# Patient Record
Sex: FEMALE | Race: WHITE | Employment: FULL TIME | URBAN - METROPOLITAN AREA
[De-identification: names, ages, dates, MRNs, and addresses within clinical notes are randomized per-mention and may not be internally consistent; named-entity substitution may affect disease eponyms.]

---

## 2020-01-21 PROBLEM — K37 APPENDICITIS: Status: ACTIVE | Noted: 2020-01-21

## 2021-07-23 ENCOUNTER — TELEPHONE (OUTPATIENT)
Dept: PAIN MANAGEMENT | Age: 31
End: 2021-07-23

## 2023-02-24 LAB
ERYTHROCYTE DISTRIBUTION WIDTH (RATIO) BY AUTOMATED COUNT: 13.3 % (ref 11.5–14.5)
ERYTHROCYTE MEAN CORPUSCULAR HEMOGLOBIN CONCENTRATION (G/DL) BY AUTOMATED: 31.8 G/DL (ref 32–36)
ERYTHROCYTE MEAN CORPUSCULAR VOLUME (FL) BY AUTOMATED COUNT: 88 FL (ref 80–100)
ERYTHROCYTES (10*6/UL) IN BLOOD BY AUTOMATED COUNT: 3.68 X10E12/L (ref 4–5.2)
GLUCOSE, 1 HR SCREEN, PREG: 139 MG/DL
HEMATOCRIT (%) IN BLOOD BY AUTOMATED COUNT: 32.4 % (ref 36–46)
HEMOGLOBIN (G/DL) IN BLOOD: 10.3 G/DL (ref 12–16)
LEUKOCYTES (10*3/UL) IN BLOOD BY AUTOMATED COUNT: 14.8 X10E9/L (ref 4.4–11.3)
PLATELETS (10*3/UL) IN BLOOD AUTOMATED COUNT: 354 X10E9/L (ref 150–450)
SYPHILIS TOTAL AB: NONREACTIVE

## 2023-03-03 LAB
COBALAMIN (VITAMIN B12) (PG/ML) IN SER/PLAS: 110 PG/ML (ref 211–911)
FERRITIN (UG/LL) IN SER/PLAS: 15 UG/L (ref 8–150)
FOLATE (NG/ML) IN SER/PLAS: 6.6 NG/ML
GLUCOSE THREE HOUR: 93 MG/DL
GLUCOSE TWO HOUR: 168 MG/DL
GLUCOSE, FASTING: 72 MG/DL
GLUCOSE, ONE HOUR: 165 MG/DL
GTTCM: ABNORMAL
IRON (UG/DL) IN SER/PLAS: 53 UG/DL (ref 35–150)
IRON BINDING CAPACITY (UG/DL) IN SER/PLAS: >503 UG/DL (ref 240–445)
IRON SATURATION (%) IN SER/PLAS: ABNORMAL % (ref 25–45)

## 2023-03-24 LAB
ERYTHROCYTE DISTRIBUTION WIDTH (RATIO) BY AUTOMATED COUNT: 13.2 % (ref 11.5–14.5)
ERYTHROCYTE MEAN CORPUSCULAR HEMOGLOBIN CONCENTRATION (G/DL) BY AUTOMATED: 31.7 G/DL (ref 32–36)
ERYTHROCYTE MEAN CORPUSCULAR VOLUME (FL) BY AUTOMATED COUNT: 86 FL (ref 80–100)
ERYTHROCYTES (10*6/UL) IN BLOOD BY AUTOMATED COUNT: 3.74 X10E12/L (ref 4–5.2)
FERRITIN, PREGNANCY: 15 UG/L
HEMATOCRIT (%) IN BLOOD BY AUTOMATED COUNT: 32.2 % (ref 36–46)
HEMOGLOBIN (G/DL) IN BLOOD: 10.2 G/DL (ref 12–16)
HEMOGLOBIN (PG) IN RETICULOCYTES: 28 PG (ref 28–38)
IRON (UG/DL) IN SER/PLAS IN PREGNANCY: 68 UG/DL
IRON BINDING CAPACITY (UG/DL) IN PREGNANCY: >518 UG/DL
IRON SATURATION (%) IN PREGNANCY: ABNORMAL %
LEUKOCYTES (10*3/UL) IN BLOOD BY AUTOMATED COUNT: 13.2 X10E9/L (ref 4.4–11.3)
PLATELETS (10*3/UL) IN BLOOD AUTOMATED COUNT: 365 X10E9/L (ref 150–450)
REFLEX ADDED, ANEMIA PANEL: ABNORMAL
RETICULOCYTES (10*3/UL) IN BLOOD: 0.07 X10E12/L (ref 0.02–0.08)
RETICULOCYTES/100 ERYTHROCYTES IN BLOOD BY AUTOMATED COUNT: 2 % (ref 0.5–2)

## 2023-03-25 LAB
FOLATE, SERUM, PREGNANCY: 9.1 NG/ML
VITAMIN B12, PREGNANCY: 238 PG/ML

## 2023-04-03 ENCOUNTER — HOSPITAL ENCOUNTER (OUTPATIENT)
Dept: DATA CONVERSION | Facility: HOSPITAL | Age: 33
End: 2023-04-03
Attending: OBSTETRICS & GYNECOLOGY
Payer: MEDICAID

## 2023-04-03 DIAGNOSIS — R10.9 UNSPECIFIED ABDOMINAL PAIN: ICD-10-CM

## 2023-04-03 DIAGNOSIS — F17.290 NICOTINE DEPENDENCE, OTHER TOBACCO PRODUCT, UNCOMPLICATED: ICD-10-CM

## 2023-04-03 DIAGNOSIS — N89.8 OTHER SPECIFIED NONINFLAMMATORY DISORDERS OF VAGINA: ICD-10-CM

## 2023-04-03 DIAGNOSIS — O99.333 SMOKING (TOBACCO) COMPLICATING PREGNANCY, THIRD TRIMESTER (HHS-HCC): ICD-10-CM

## 2023-04-03 DIAGNOSIS — Z3A.32 32 WEEKS GESTATION OF PREGNANCY (HHS-HCC): ICD-10-CM

## 2023-04-03 DIAGNOSIS — O26.893 OTHER SPECIFIED PREGNANCY RELATED CONDITIONS, THIRD TRIMESTER (HHS-HCC): ICD-10-CM

## 2023-04-21 ENCOUNTER — HOSPITAL ENCOUNTER (OUTPATIENT)
Dept: DATA CONVERSION | Facility: HOSPITAL | Age: 33
End: 2023-04-21
Attending: OBSTETRICS & GYNECOLOGY
Payer: MEDICAID

## 2023-04-21 DIAGNOSIS — O99.343 OTHER MENTAL DISORDERS COMPLICATING PREGNANCY, THIRD TRIMESTER (HHS-HCC): ICD-10-CM

## 2023-04-21 DIAGNOSIS — O99.013 ANEMIA COMPLICATING PREGNANCY, THIRD TRIMESTER (HHS-HCC): ICD-10-CM

## 2023-04-21 DIAGNOSIS — K59.00 CONSTIPATION, UNSPECIFIED: ICD-10-CM

## 2023-04-21 DIAGNOSIS — R10.9 UNSPECIFIED ABDOMINAL PAIN: ICD-10-CM

## 2023-04-21 DIAGNOSIS — F32.A DEPRESSION, UNSPECIFIED: ICD-10-CM

## 2023-04-21 DIAGNOSIS — O26.893 OTHER SPECIFIED PREGNANCY RELATED CONDITIONS, THIRD TRIMESTER (HHS-HCC): ICD-10-CM

## 2023-04-21 DIAGNOSIS — O99.333 SMOKING (TOBACCO) COMPLICATING PREGNANCY, THIRD TRIMESTER (HHS-HCC): ICD-10-CM

## 2023-04-21 DIAGNOSIS — F17.200 NICOTINE DEPENDENCE, UNSPECIFIED, UNCOMPLICATED: ICD-10-CM

## 2023-04-21 DIAGNOSIS — Z79.899 OTHER LONG TERM (CURRENT) DRUG THERAPY: ICD-10-CM

## 2023-04-21 DIAGNOSIS — Z3A.35 35 WEEKS GESTATION OF PREGNANCY (HHS-HCC): ICD-10-CM

## 2023-04-21 DIAGNOSIS — F41.9 ANXIETY DISORDER, UNSPECIFIED: ICD-10-CM

## 2023-04-21 DIAGNOSIS — O99.613 DISEASES OF THE DIGESTIVE SYSTEM COMPLICATING PREGNANCY, THIRD TRIMESTER (HHS-HCC): ICD-10-CM

## 2023-04-21 DIAGNOSIS — D64.9 ANEMIA, UNSPECIFIED: ICD-10-CM

## 2023-04-21 LAB
APPEARANCE, URINE: CLEAR
BILIRUBIN, URINE: NEGATIVE
BLOOD, URINE: NEGATIVE
COLOR, URINE: ABNORMAL
GLUCOSE, URINE: NEGATIVE MG/DL
KETONES, URINE: NEGATIVE MG/DL
LEUKOCYTE ESTERASE, URINE: NEGATIVE
NITRITE, URINE: NEGATIVE
PH, URINE: 7 (ref 5–8)
PROTEIN, URINE: NEGATIVE MG/DL
SPECIFIC GRAVITY, URINE: 1 (ref 1–1.03)
UROBILINOGEN, URINE: <2 MG/DL (ref 0–1.9)

## 2023-05-01 LAB — GROUP B STREP SCREEN: NORMAL

## 2023-08-16 ENCOUNTER — TELEPHONE (OUTPATIENT)
Dept: PRIMARY CARE | Facility: CLINIC | Age: 33
End: 2023-08-16

## 2023-08-16 ENCOUNTER — OFFICE VISIT (OUTPATIENT)
Dept: PRIMARY CARE | Facility: CLINIC | Age: 33
End: 2023-08-16
Payer: MEDICAID

## 2023-08-16 VITALS
WEIGHT: 158 LBS | HEART RATE: 68 BPM | SYSTOLIC BLOOD PRESSURE: 122 MMHG | DIASTOLIC BLOOD PRESSURE: 70 MMHG | TEMPERATURE: 98.4 F | BODY MASS INDEX: 26.29 KG/M2

## 2023-08-16 DIAGNOSIS — G43.111 INTRACTABLE MIGRAINE WITH AURA WITH STATUS MIGRAINOSUS: Primary | ICD-10-CM

## 2023-08-16 PROBLEM — G43.909 MIGRAINE: Status: ACTIVE | Noted: 2023-08-16

## 2023-08-16 PROCEDURE — 99213 OFFICE O/P EST LOW 20 MIN: CPT | Performed by: INTERNAL MEDICINE

## 2023-08-16 PROCEDURE — 1036F TOBACCO NON-USER: CPT | Performed by: INTERNAL MEDICINE

## 2023-08-16 RX ORDER — NORETHINDRONE ACETATE AND ETHINYL ESTRADIOL, AND FERROUS FUMARATE 1.5-30(21)
1 KIT ORAL DAILY
COMMUNITY
End: 2024-06-06 | Stop reason: WASHOUT

## 2023-08-16 RX ORDER — ELETRIPTAN HYDROBROMIDE 20 MG/1
20 TABLET, FILM COATED ORAL ONCE AS NEEDED
Qty: 18 TABLET | Refills: 3 | Status: SHIPPED | OUTPATIENT
Start: 2023-08-16 | End: 2023-08-16

## 2023-08-16 RX ORDER — SUMATRIPTAN 50 MG/1
50 TABLET, FILM COATED ORAL ONCE AS NEEDED
Qty: 27 TABLET | Refills: 1 | Status: SHIPPED | OUTPATIENT
Start: 2023-08-16 | End: 2023-09-20 | Stop reason: SDUPTHER

## 2023-08-16 ASSESSMENT — ENCOUNTER SYMPTOMS
LIGHT-HEADEDNESS: 0
GASTROINTESTINAL NEGATIVE: 1
HEMATOLOGIC/LYMPHATIC NEGATIVE: 1
CARDIOVASCULAR NEGATIVE: 1
CONSTITUTIONAL NEGATIVE: 1
DIZZINESS: 0
RESPIRATORY NEGATIVE: 1
MUSCULOSKELETAL NEGATIVE: 1
EYES NEGATIVE: 1
FACIAL ASYMMETRY: 0
HEADACHES: 1
NUMBNESS: 0

## 2023-08-16 NOTE — PROGRESS NOTES
Subjective   Patient ID: Nicole Nunes is a 32 y.o. female who presents for Follow-up (Pt here for OV and migraine med refill).    HPI patient is here for intractable migraine for 3 days she restarted her birth control pill after her son was born 3 months ago and after starting the birth control pill her migraines have returned she has been trying to use Tylenol or Motrin.    Review of Systems   Constitutional: Negative.    HENT: Negative.     Eyes: Negative.    Respiratory: Negative.     Cardiovascular: Negative.    Gastrointestinal: Negative.    Genitourinary: Negative.    Musculoskeletal: Negative.    Neurological:  Positive for headaches. Negative for dizziness, syncope, facial asymmetry, light-headedness and numbness.   Hematological: Negative.    All other systems reviewed and are negative.      Objective   /70   Pulse 68   Temp 36.9 °C (98.4 °F)   Wt 71.7 kg (158 lb)   LMP 07/21/2023   BMI 26.29 kg/m²     Physical Exam  Vitals and nursing note reviewed.   Constitutional:       Appearance: Normal appearance.   HENT:      Head: Normocephalic and atraumatic.   Eyes:      Pupils: Pupils are equal, round, and reactive to light.   Pulmonary:      Effort: Pulmonary effort is normal.      Breath sounds: Normal breath sounds.   Neurological:      Mental Status: She is alert.       Assessment/Plan   Problem List Items Addressed This Visit       Migraine - Primary   Patient was given prescription of triptan initially Relpax which was not approved by insurance so then we sent prescription of sumatriptan.  Patient should avoid using excessive analgesics since she also can have medication overuse headache.  She will also talk to her gynecologist about alternate birth control strategy.  Follow-up with us in few weeks.

## 2023-09-06 VITALS — HEIGHT: 65 IN | BODY MASS INDEX: 30.63 KG/M2 | WEIGHT: 183.86 LBS

## 2023-09-07 VITALS — WEIGHT: 181.88 LBS | BODY MASS INDEX: 30.3 KG/M2 | HEIGHT: 65 IN

## 2023-09-14 NOTE — PROGRESS NOTES
"    Current Stage:   Stage: Triage     Subjective Data:   Antepartum   Vaginal Bleeding: No   Contractions/Abdominal Pain: Yes   Discharge/Loss of Fluid: No   Fetal Movement: Good   Fevers/Chills: No   Preeclampsia Symptoms: No   Antepartum:    33yo  @ 35.2 wks presents with c/o contractions, reports that she \"gets really bad menstrual cramps so this is the real thing\". Endorses GFM, denies any VB or  LOF. States that the pain began around 12 pm and hasn't let up at all. Denies urinary frequency or pain with urination. Reports she has been taking iron and struggling with constipation. Last BM yesterday morning.     Pregnancy notable for:  *Anx/dep, no meds  *Anemia, PO iron  *Failed 1 hr, passed 3 hr  *Headaches  *Constipation      Objective Information     Objective Information:      T   P  R  BP   MAP  SpO2   Value  36.7  106  18  119/76   92  98%  Date/Time  15:29  15:29  15:29  15:29   15:29  15:29  Range  (36.7C - 36.7C )  (99 - 125 )  (18 - 18 )  (119 - 119 )/ (76 - 76 )  (92 - 92 )  (97% - 98% )      Pain reported at  15:29: 6 = Moderate      Physical Exam:   Constitutional: alert, oriented   Obstetric: FHR: 145 BPM, moderate variability, +  accels, no decels  CTX: some irritability noted, no ctx  VE: closed, posterior   Membranes: Intact   Eyes: pupils equal, sclerae clear   Respiratory/Thorax: normal respiratory effort, lungs  clear, no wheezes or rhonchi   Cardiovascular: RRR, no murmurs   Gastrointestinal: soft, tender with palpation, +  BS   Genitourinary: bladder nondistended, no suprapubic  tenderness, no CVA tenderness   Musculoskeletal: Normal ROM   Extremities: no swelling, no calf tenderness   Breast: soft, nipples intact   Psychological: appropriate affect, calm, cooperative   Skin: no rashes or lesions     Assessment and Plan:   Assessment:    A: 33yo  @ 35.2 wks  Abdominal pain  Reactive NST    P: Urinalysis sent  Will recheck cervix in 2 hrs  Discussed " comfort measures, hydration, taking stool softener to help with constipation  Encouraged ambulation   If no change, will D/C home with PTL precautions.  Dr Goddard updated and agrees with POC    CHAN Ramires      Electronic Signatures for Addendum Section:   Sary Cormier (CHAN) (Signed Addendum 21-Apr-2023 16:47)   UA negative, pt declines a recheck and feels comfortable going home.   RTC as scheduled for [facility] visit next week or sooner PRN     Electronic Signatures:  Sary Cormier (HAROLDO-HANNY)  (Signed 21-Apr-2023 16:23)   Authored: Current Stage, Subjective Data, Objective Data,  Assessment and Plan, Note Completion      Last Updated: 21-Apr-2023 16:47 by Sary Cormier (HAROLDO-HANNY)

## 2023-09-20 ENCOUNTER — TELEMEDICINE (OUTPATIENT)
Dept: PRIMARY CARE | Facility: CLINIC | Age: 33
End: 2023-09-20
Payer: MEDICAID

## 2023-09-20 DIAGNOSIS — G43.111 INTRACTABLE MIGRAINE WITH AURA WITH STATUS MIGRAINOSUS: ICD-10-CM

## 2023-09-20 PROCEDURE — 99212 OFFICE O/P EST SF 10 MIN: CPT | Performed by: INTERNAL MEDICINE

## 2023-09-20 RX ORDER — SUMATRIPTAN 50 MG/1
50 TABLET, FILM COATED ORAL ONCE AS NEEDED
Qty: 27 TABLET | Refills: 1 | Status: SHIPPED | OUTPATIENT
Start: 2023-09-20 | End: 2024-09-19

## 2023-09-20 ASSESSMENT — ENCOUNTER SYMPTOMS
HEADACHES: 1
ENDOCRINE NEGATIVE: 1
CONSTITUTIONAL NEGATIVE: 1
EYES NEGATIVE: 1
RESPIRATORY NEGATIVE: 1

## 2023-09-20 NOTE — PROGRESS NOTES
Subjective   Patient ID: Nicole Nunes is a 33 y.o. female who presents for No chief complaint on file..    HPI patient is doing well she is on sumatriptan as needed for her migraines.  She has not talked to her gynecologist about changing her birth control pill which seem to be causing her migraines to flareup    Review of Systems   Constitutional: Negative.    Eyes: Negative.    Respiratory: Negative.     Endocrine: Negative.    Genitourinary: Negative.    Neurological:  Positive for headaches.   All other systems reviewed and are negative.      Objective   There were no vitals taken for this visit.    Physical Exam    Assessment/Plan   Problem List Items Addressed This Visit       Migraine    Relevant Medications    SUMAtriptan (Imitrex) 50 mg tablet   Patient's sumatriptan was refilled she was advised to talk to her gynecologist about alternate ways of birth control.  Follow-up with us in few weeks.

## 2023-10-17 ENCOUNTER — HOSPITAL ENCOUNTER (OUTPATIENT)
Dept: RADIOLOGY | Facility: HOSPITAL | Age: 33
Discharge: HOME | End: 2023-10-17
Payer: MEDICAID

## 2023-10-17 DIAGNOSIS — M23.90 UNSPECIFIED INTERNAL DERANGEMENT OF UNSPECIFIED KNEE: ICD-10-CM

## 2023-10-17 PROCEDURE — 73721 MRI JNT OF LWR EXTRE W/O DYE: CPT | Mod: RT

## 2023-10-17 PROCEDURE — 73721 MRI JNT OF LWR EXTRE W/O DYE: CPT | Mod: RIGHT SIDE | Performed by: RADIOLOGY

## 2023-10-26 ENCOUNTER — APPOINTMENT (OUTPATIENT)
Dept: ORTHOPEDIC SURGERY | Facility: CLINIC | Age: 33
End: 2023-10-26
Payer: MEDICAID

## 2023-11-01 PROBLEM — M84.30XA STRESS FRACTURE: Status: ACTIVE | Noted: 2023-11-01

## 2023-11-01 PROBLEM — S83.206A TEAR OF MENISCUS OF RIGHT KNEE: Status: ACTIVE | Noted: 2023-11-01

## 2023-11-01 PROBLEM — S46.019A TRAUMATIC ROTATOR CUFF TEAR: Status: ACTIVE | Noted: 2021-06-01

## 2023-11-01 PROBLEM — S43.92XA SPRAIN OF LEFT SHOULDER GIRDLE: Status: ACTIVE | Noted: 2023-11-01

## 2023-11-01 PROBLEM — F41.9 ANXIETY: Status: ACTIVE | Noted: 2023-11-01

## 2023-11-01 PROBLEM — M47.812 CERVICAL SPONDYLOSIS WITHOUT MYELOPATHY: Status: ACTIVE | Noted: 2021-06-01

## 2023-11-01 PROBLEM — O99.810 ABNORMAL MATERNAL GLUCOSE TOLERANCE, COMPLICATING PREGNANCY (HHS-HCC): Status: ACTIVE | Noted: 2023-11-01

## 2023-11-01 PROBLEM — K59.03 DRUG-INDUCED CONSTIPATION: Status: ACTIVE | Noted: 2023-11-01

## 2023-11-01 PROBLEM — R73.09 ABNORMAL GTT (GLUCOSE TOLERANCE TEST): Status: ACTIVE | Noted: 2023-11-01

## 2023-11-01 PROBLEM — S92.413A: Status: ACTIVE | Noted: 2023-11-01

## 2023-11-01 PROBLEM — A49.8 GARDNERELLA INFECTION: Status: ACTIVE | Noted: 2023-11-01

## 2023-11-01 PROBLEM — S83.90XA KNEE SPRAIN: Status: ACTIVE | Noted: 2023-11-01

## 2023-11-01 PROBLEM — O99.019 ANEMIA IN PREGNANCY (HHS-HCC): Status: ACTIVE | Noted: 2023-11-01

## 2023-11-01 PROBLEM — S83.519A ACL TEAR: Status: ACTIVE | Noted: 2023-11-01

## 2023-11-01 PROBLEM — S93.402A SPRAIN OF LEFT ANKLE: Status: ACTIVE | Noted: 2023-11-01

## 2023-11-01 PROBLEM — G44.84 BENIGN EXERTIONAL HEADACHE: Status: ACTIVE | Noted: 2023-11-01

## 2023-11-01 PROBLEM — G43.119: Status: ACTIVE | Noted: 2023-11-01

## 2023-11-01 PROBLEM — Z98.890 POST-OPERATIVE STATE: Status: ACTIVE | Noted: 2019-03-05

## 2023-11-01 PROBLEM — K37 APPENDICITIS: Status: ACTIVE | Noted: 2020-01-21

## 2023-11-01 PROBLEM — R53.83 FATIGUE: Status: ACTIVE | Noted: 2023-11-01

## 2023-11-01 PROBLEM — F41.0 PANIC ATTACKS: Status: ACTIVE | Noted: 2023-11-01

## 2023-11-01 PROBLEM — M23.91 INTERNAL DERANGEMENT OF RIGHT KNEE: Status: ACTIVE | Noted: 2023-11-01

## 2023-11-01 RX ORDER — ONDANSETRON 4 MG/1
TABLET, ORALLY DISINTEGRATING ORAL
COMMUNITY
End: 2024-06-06 | Stop reason: WASHOUT

## 2023-11-01 RX ORDER — KETOCONAZOLE 20 MG/G
CREAM TOPICAL
COMMUNITY
Start: 2023-09-13 | End: 2024-06-06 | Stop reason: WASHOUT

## 2023-11-01 RX ORDER — ONDANSETRON 4 MG/1
1 TABLET, FILM COATED ORAL EVERY 8 HOURS PRN
COMMUNITY
Start: 2019-12-01 | End: 2024-06-06 | Stop reason: WASHOUT

## 2023-11-01 RX ORDER — TRAZODONE HYDROCHLORIDE 50 MG/1
50-150 TABLET ORAL NIGHTLY
COMMUNITY
Start: 2015-07-14 | End: 2024-06-06 | Stop reason: WASHOUT

## 2023-11-01 RX ORDER — TIZANIDINE 4 MG/1
1 TABLET ORAL EVERY 8 HOURS PRN
COMMUNITY
Start: 2021-05-03 | End: 2024-06-06 | Stop reason: WASHOUT

## 2023-11-01 RX ORDER — SERTRALINE HYDROCHLORIDE 50 MG/1
50 TABLET, FILM COATED ORAL DAILY
COMMUNITY
End: 2024-06-06 | Stop reason: WASHOUT

## 2023-11-01 RX ORDER — ELETRIPTAN HYDROBROMIDE 20 MG/1
TABLET, FILM COATED ORAL
COMMUNITY
End: 2024-06-06 | Stop reason: WASHOUT

## 2023-11-01 RX ORDER — VERAPAMIL HYDROCHLORIDE 180 MG/1
180 CAPSULE, EXTENDED RELEASE ORAL
COMMUNITY
Start: 2015-07-14 | End: 2024-06-06 | Stop reason: WASHOUT

## 2023-11-01 RX ORDER — DOCUSATE SODIUM 100 MG/1
1 CAPSULE ORAL 2 TIMES DAILY
COMMUNITY
Start: 2023-03-05 | End: 2024-06-06 | Stop reason: WASHOUT

## 2023-11-01 RX ORDER — PNV NO.95/FERROUS FUM/FOLIC AC 28MG-0.8MG
1 TABLET ORAL DAILY
COMMUNITY
Start: 2022-09-26 | End: 2024-06-06 | Stop reason: WASHOUT

## 2023-11-01 RX ORDER — MELOXICAM 15 MG/1
TABLET ORAL DAILY PRN
COMMUNITY
Start: 2021-05-03 | End: 2024-06-06 | Stop reason: WASHOUT

## 2023-11-01 RX ORDER — KETOROLAC TROMETHAMINE 10 MG/1
1 TABLET, FILM COATED ORAL EVERY 6 HOURS PRN
COMMUNITY
Start: 2020-12-01 | End: 2024-06-06 | Stop reason: WASHOUT

## 2023-11-01 RX ORDER — GUAIFENESIN AND DEXTROMETHORPHAN HYDROBROMIDE 1200; 60 MG/1; MG/1
1 TABLET, EXTENDED RELEASE ORAL 2 TIMES DAILY
COMMUNITY
Start: 2020-12-28 | End: 2024-06-06 | Stop reason: WASHOUT

## 2023-11-01 RX ORDER — TRAMADOL HYDROCHLORIDE 50 MG/1
1-2 TABLET ORAL EVERY 6 HOURS PRN
COMMUNITY
Start: 2021-06-04 | End: 2024-06-06 | Stop reason: WASHOUT

## 2023-11-01 RX ORDER — FERROUS SULFATE 325(65) MG
65 TABLET ORAL 2 TIMES DAILY
COMMUNITY
Start: 2023-03-05 | End: 2024-06-06 | Stop reason: WASHOUT

## 2023-11-02 ENCOUNTER — OFFICE VISIT (OUTPATIENT)
Dept: ORTHOPEDIC SURGERY | Facility: CLINIC | Age: 33
End: 2023-11-02
Payer: MEDICAID

## 2023-11-02 DIAGNOSIS — M23.91 INTERNAL DERANGEMENT OF RIGHT KNEE: Primary | ICD-10-CM

## 2023-11-02 PROCEDURE — 1036F TOBACCO NON-USER: CPT | Performed by: ORTHOPAEDIC SURGERY

## 2023-11-02 PROCEDURE — 99213 OFFICE O/P EST LOW 20 MIN: CPT | Performed by: ORTHOPAEDIC SURGERY

## 2023-11-02 NOTE — PROGRESS NOTES
History of Present Illness:     Patient presents today for MRI review of the right knee after multiple surgeries including ACL reconstruction and partial medial and lateral meniscectomy as well as chondroplasty and subsequent arthroscopy with chondroplasty of the patella.  She is having recurring anterior knee pain and catching and locking that has been refractory to a variety of treatment allergies including rest, ice, anti-inflammatories, corticosteroid injection, PRP, physical therapy.  Is not helping her and she went to discuss other options today.  She recently had a baby and she is worried that she will catch and lock and fall and drop her child.  Review of Systems   GENERAL: Negative for malaise, significant weight loss, fever  MUSCULOSKELETAL: see HPI  NEURO:  Negative    Physical Examination:  Right knee:  Skin healthy and intact  No gross swelling or ecchymosis  No varus malalignment  No valgus malalignment   No effusion  ROM:  Full flexion   Full extension  No pain with internal rotation of the hip     No tenderness to palpation over medial joint line  No tenderness to palpation over lateral joint line  There is tenderness to palpation anteriorly and anterior lateral along the joint line.  There is tenderness to palpation with patellar compression and anterior posterior direction As well as medial lateral.  No laxity to valgus stress  No laxity to varus stress  Negative Lachman´s test  Negative anterior drawer test  Negative posterior drawer test  Equivocal Jose´s test     Neurovascular exam normal distally    Imaging:  MRI reveals no evidence of acute ligamentous abnormality, appropriately positioned and intact ACL graft, no evidence of recurrent meniscal tearing but evidence of prior meniscectomy.  There is mild patellofemoral cartilage wear noted without evidence of displaced fragments.    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  We discussed that these nerves in the fat pad are likely very irritated from multiple arthroscopies as well as overall disease progression of the knee.  We reviewed anti-inflammatories, rest, ice, steroid injections, physical therapy.  We discussed this problem is likely self-limiting over time but does take many months to fully improve.  Recommended continuing her anti-inflammatory regimen, and being patient with the knee going forward.  Structurally we discussed the knee is excellent, however we discussed that we feel perhaps her pain is contributing to her catching and locking events from anu quadriceps muscle group.    Roalndo Castanon PA-C    In a face to face encounter, I evaluated the patient and performed a physical examination, discussed pertinent diagnostic studies if indicated and discussed diagnosis and management strategies with both the patient and physician assistant / nurse practitioner.  I reviewed the PA/NP's note and agree with the documented findings and plan of care.    Patient with persistent anterior knee pain status post ACL reconstruction she has some minimal chondral wear but does have significant tenderness around her retropatellar fat pad and some occasional catching we discussed this is likely synovial fat pad impingement and may be tethered.  We discussed possible hydrodissection with normal saline.  Patient was amenable to this treatment plan overall no obvious structural abnormalities.    Leroy Cuba MD

## 2023-11-04 ENCOUNTER — HOSPITAL ENCOUNTER (EMERGENCY)
Age: 33
Discharge: HOME | End: 2023-11-04
Attending: EMERGENCY MEDICINE
Payer: MEDICAID

## 2023-11-04 VITALS
BODY MASS INDEX: 24.99 KG/M2 | WEIGHT: 150 LBS | SYSTOLIC BLOOD PRESSURE: 126 MMHG | HEART RATE: 113 BPM | HEIGHT: 65 IN | OXYGEN SATURATION: 98 % | TEMPERATURE: 97.6 F | RESPIRATION RATE: 18 BRPM | DIASTOLIC BLOOD PRESSURE: 87 MMHG

## 2023-11-04 DIAGNOSIS — K04.7 DENTAL INFECTION: Primary | ICD-10-CM

## 2023-11-04 PROCEDURE — 99283 EMERGENCY DEPT VISIT LOW MDM: CPT

## 2023-11-04 PROCEDURE — 6370000000 HC RX 637 (ALT 250 FOR IP): Performed by: EMERGENCY MEDICINE

## 2023-11-04 RX ORDER — PENICILLIN V POTASSIUM 250 MG/1
500 TABLET ORAL ONCE
Status: COMPLETED | OUTPATIENT
Start: 2023-11-04 | End: 2023-11-04

## 2023-11-04 RX ORDER — NORETHINDRONE ACETATE AND ETHINYL ESTRADIOL 1.5-30(21)
1 KIT ORAL DAILY
COMMUNITY

## 2023-11-04 RX ORDER — HYDROCODONE BITARTRATE AND ACETAMINOPHEN 5; 325 MG/1; MG/1
1 TABLET ORAL ONCE
Status: COMPLETED | OUTPATIENT
Start: 2023-11-04 | End: 2023-11-04

## 2023-11-04 RX ORDER — PENICILLIN V POTASSIUM 500 MG/1
500 TABLET ORAL 3 TIMES DAILY
Qty: 30 TABLET | Refills: 0 | Status: SHIPPED | OUTPATIENT
Start: 2023-11-04 | End: 2023-11-14

## 2023-11-04 RX ORDER — HYDROCODONE BITARTRATE AND ACETAMINOPHEN 5; 325 MG/1; MG/1
1 TABLET ORAL EVERY 6 HOURS PRN
Qty: 10 TABLET | Refills: 0 | Status: SHIPPED | OUTPATIENT
Start: 2023-11-04 | End: 2023-11-07

## 2023-11-04 RX ADMIN — PENICILLIN V POTASSIUM 500 MG: 250 TABLET, FILM COATED ORAL at 17:06

## 2023-11-04 RX ADMIN — HYDROCODONE BITARTRATE AND ACETAMINOPHEN 1 TABLET: 5; 325 TABLET ORAL at 17:06

## 2023-11-04 ASSESSMENT — PAIN - FUNCTIONAL ASSESSMENT
PAIN_FUNCTIONAL_ASSESSMENT: 0-10
PAIN_FUNCTIONAL_ASSESSMENT: 0-10

## 2023-11-04 ASSESSMENT — PAIN DESCRIPTION - DESCRIPTORS
DESCRIPTORS: ACHING;SHARP
DESCRIPTORS: ACHING

## 2023-11-04 ASSESSMENT — PAIN DESCRIPTION - PAIN TYPE: TYPE: ACUTE PAIN

## 2023-11-04 ASSESSMENT — PAIN SCALES - GENERAL
PAINLEVEL_OUTOF10: 7
PAINLEVEL_OUTOF10: 9

## 2023-11-04 ASSESSMENT — PAIN DESCRIPTION - LOCATION
LOCATION: TEETH
LOCATION: MOUTH

## 2023-11-04 ASSESSMENT — PAIN DESCRIPTION - ONSET: ONSET: ON-GOING

## 2023-11-04 ASSESSMENT — PAIN DESCRIPTION - ORIENTATION: ORIENTATION: RIGHT;LOWER

## 2023-11-04 ASSESSMENT — PAIN DESCRIPTION - FREQUENCY: FREQUENCY: CONTINUOUS

## 2023-11-27 DIAGNOSIS — F41.9 ANXIETY: ICD-10-CM

## 2023-11-28 RX ORDER — HYDROXYZINE HYDROCHLORIDE 25 MG/1
TABLET, FILM COATED ORAL
Qty: 1 TABLET | Refills: 0 | Status: SHIPPED | OUTPATIENT
Start: 2023-11-28

## 2024-02-29 ENCOUNTER — HOSPITAL ENCOUNTER (EMERGENCY)
Age: 34
Discharge: HOME | End: 2024-02-29
Attending: EMERGENCY MEDICINE | Admitting: EMERGENCY MEDICINE
Payer: MEDICAID

## 2024-02-29 VITALS
WEIGHT: 147 LBS | TEMPERATURE: 98.1 F | HEART RATE: 83 BPM | SYSTOLIC BLOOD PRESSURE: 122 MMHG | RESPIRATION RATE: 16 BRPM | OXYGEN SATURATION: 97 % | HEIGHT: 65 IN | BODY MASS INDEX: 24.49 KG/M2 | DIASTOLIC BLOOD PRESSURE: 79 MMHG

## 2024-02-29 DIAGNOSIS — J11.1 INFLUENZA: Primary | ICD-10-CM

## 2024-02-29 LAB
INFLUENZA A BY PCR: NEGATIVE
INFLUENZA B BY PCR: NEGATIVE
SARS-COV-2 RDRP RESP QL NAA+PROBE: NOT DETECTED
STREP GRP A PCR: NEGATIVE

## 2024-02-29 PROCEDURE — 87651 STREP A DNA AMP PROBE: CPT

## 2024-02-29 PROCEDURE — 99283 EMERGENCY DEPT VISIT LOW MDM: CPT

## 2024-02-29 PROCEDURE — 87502 INFLUENZA DNA AMP PROBE: CPT

## 2024-02-29 PROCEDURE — 87635 SARS-COV-2 COVID-19 AMP PRB: CPT

## 2024-02-29 RX ORDER — ONDANSETRON 4 MG/1
4 TABLET, ORALLY DISINTEGRATING ORAL
Qty: 10 TABLET | Refills: 1 | Status: SHIPPED | OUTPATIENT
Start: 2024-02-29

## 2024-02-29 RX ORDER — OSELTAMIVIR PHOSPHATE 75 MG/1
75 CAPSULE ORAL 2 TIMES DAILY
Qty: 10 CAPSULE | Refills: 0 | Status: SHIPPED | OUTPATIENT
Start: 2024-02-29 | End: 2024-03-05

## 2024-02-29 RX ORDER — ALBUTEROL SULFATE 90 UG/1
2 AEROSOL, METERED RESPIRATORY (INHALATION) EVERY 4 HOURS PRN
Qty: 18 G | Refills: 1 | Status: SHIPPED | OUTPATIENT
Start: 2024-02-29

## 2024-02-29 NOTE — ED PROVIDER NOTES
CC/HPI: 33-year-old female to the emergency department with flulike symptoms.  Patient states that she awoke in the middle of the night with fever chills body aches.  Fever up to 104.  Patient states she is coughing and congested.  Patient has a child that is positive for the flu.  No vomiting no diarrhea    VITALS/PMH/PSH: Reviewed per nurses notes    REVIEW OF SYSTEMS: As in chief complaint history of present illness, otherwise all other systems are reviewed and negative the total 10 systems reviewed    PHYSICAL EXAM:  GEN: Pt alert and oriented, no acute distress  HEENT:         Normocephalic/Atramatic        PERRL, EOMI       Throat non-edematous.  No erythema noted.  No exudates noted.  Moist membranes  NECK: Nontender, no signs of trauma, no lymphadenopathy  HEART: Reg S1/S2, without murmer, rub or gallop  LUNGS: Clear to auscultation bilaterally, respirations even and unlabored  MUSCULOSKELETAL/EXTREMITITES:  No signs of trauma, cyanosis or edema.    LYMPH: no peripheral lympadenopathy noted  SKIN:  Warm & dry, no rash  NEUROLOGIC:  Alert and oriented.  Speech clear    Medical decision making/ED course;  33-year-old female exposed to the flu with flulike symptoms that began early this morning    Patient flu COVID and strep testing negative in the emergency department.  Discussed with patient that with the flu exposure and her symptoms likely is the flu.  Discussed we will prescribe her Tamiflu and also Zofran as a precaution.  Patient also has a history of asthma and does not currently have an inhaler so we will prescribe that as well.    Final Clinical impression;  1) influenza    Disposition/plan; patient discharged home in stable condition given discharge instructions on influenza.  Patient given prescription for Zofran albuterol and Tamiflu.  Return for worsening or changes to symptoms.  Patient given off work note.     Tee Rangel, DO  02/29/24 [id number]

## 2024-06-06 ENCOUNTER — OFFICE VISIT (OUTPATIENT)
Dept: OBSTETRICS AND GYNECOLOGY | Facility: CLINIC | Age: 34
End: 2024-06-06
Payer: COMMERCIAL

## 2024-06-06 VITALS
WEIGHT: 152.6 LBS | BODY MASS INDEX: 25.43 KG/M2 | HEIGHT: 65 IN | DIASTOLIC BLOOD PRESSURE: 70 MMHG | SYSTOLIC BLOOD PRESSURE: 100 MMHG

## 2024-06-06 DIAGNOSIS — Z12.4 SCREENING FOR CERVICAL CANCER: ICD-10-CM

## 2024-06-06 DIAGNOSIS — N63.20 MASS OF LEFT BREAST, UNSPECIFIED QUADRANT: ICD-10-CM

## 2024-06-06 DIAGNOSIS — Z01.411 ENCNTR FOR GYN EXAM (GENERAL) (ROUTINE) W ABNORMAL FINDINGS: Primary | ICD-10-CM

## 2024-06-06 DIAGNOSIS — R68.82 DECREASED LIBIDO WITHOUT SEXUAL DYSFUNCTION: ICD-10-CM

## 2024-06-06 PROCEDURE — 87624 HPV HI-RISK TYP POOLED RSLT: CPT

## 2024-06-06 PROCEDURE — 99212 OFFICE O/P EST SF 10 MIN: CPT | Performed by: ADVANCED PRACTICE MIDWIFE

## 2024-06-06 PROCEDURE — 99395 PREV VISIT EST AGE 18-39: CPT | Performed by: ADVANCED PRACTICE MIDWIFE

## 2024-06-06 ASSESSMENT — PATIENT HEALTH QUESTIONNAIRE - PHQ9
2. FEELING DOWN, DEPRESSED OR HOPELESS: NOT AT ALL
SUM OF ALL RESPONSES TO PHQ9 QUESTIONS 1 AND 2: 0
1. LITTLE INTEREST OR PLEASURE IN DOING THINGS: NOT AT ALL

## 2024-06-06 ASSESSMENT — ENCOUNTER SYMPTOMS
DEPRESSION: 0
OCCASIONAL FEELINGS OF UNSTEADINESS: 0
LOSS OF SENSATION IN FEET: 0

## 2024-06-06 NOTE — PROGRESS NOTES
"GYNECOLOGY PROGRESS NOTE        CC:  see below. Patient needs her pap today and if wnl then repeat 5 years. Denies discharge or itching. Patient c/o decreased libido. Since having her child she has no sex drive. Patient willing to try and pay for Rx for Addyi out pocket. Patient has breast implants. Never . Patient c/o of mass noted under Left nipple.   Chief Complaint   Patient presents with    Annual Exam     Est pt annual exam.   Pap 3 yrs  Hx of abnormal pap: no   Concerns: States feels left breast lump. Also states has a hemorrhoid that is really bad. Also since having last baby a year ago has no sex drive.        HPI:  Nicole Nunes is here for a routine GYN examination.  No GYN c/o, no AUB.      Depression screen:  negative      ROS:  GI - no blood in BMs  URO - no hematuria  GYN - no AUB or vaginal discharge  PSYCH - mood OK        PHYSICAL EXAM:  /70 (BP Location: Left arm, Patient Position: Sitting, BP Cuff Size: Adult)   Ht 1.651 m (5' 5\")   Wt 69.2 kg (152 lb 9.6 oz)   LMP 05/10/2024   BMI 25.39 kg/m²   GEN:  A&O, NAD  URO:  normal urethra, no bladder TTP  GYN:  normal vulva and perineum w/o lesions or ulcers, normal vagina without discharge or lesions, normal cervix without lesions or discharge or CMT, uterus NT/NE, adnexa mobile and NT/NE  BREAST:  no skin lesions or nipple discharge bilaterally. Right breast normal. Left breast with small pea size mobile massed noted under nipple.  DERM:  no hirsutism or acne   PSYCH:  normal affect, non-anxious      IMPRESSION/PLAN:    A: normal gyn exam. Decreased libido. Small pea size cyst/mass noted Left breast under nipple.   Plan: 1. Pap if wnl repeat 5 years. 2. Diagnostic mammogram. 3. Rx for Addyi 1 every day for decreased libido.   Problem List Items Addressed This Visit    None        ASCCP pap smear screening guidelines reviewed with the patient.        HAROLDO Tolentino-HANNY  "

## 2024-06-12 ENCOUNTER — HOSPITAL ENCOUNTER (OUTPATIENT)
Dept: RADIOLOGY | Facility: HOSPITAL | Age: 34
Discharge: HOME | End: 2024-06-12
Payer: COMMERCIAL

## 2024-06-12 VITALS — WEIGHT: 153 LBS | BODY MASS INDEX: 25.49 KG/M2 | HEIGHT: 65 IN

## 2024-06-12 DIAGNOSIS — N63.20 MASS OF LEFT BREAST, UNSPECIFIED QUADRANT: ICD-10-CM

## 2024-06-12 DIAGNOSIS — R68.82 DECREASED LIBIDO WITHOUT SEXUAL DYSFUNCTION: ICD-10-CM

## 2024-06-12 PROCEDURE — 77062 BREAST TOMOSYNTHESIS BI: CPT

## 2024-06-12 PROCEDURE — 76642 ULTRASOUND BREAST LIMITED: CPT | Mod: LT

## 2024-06-12 PROCEDURE — 76642 ULTRASOUND BREAST LIMITED: CPT | Performed by: RADIOLOGY

## 2024-06-12 PROCEDURE — 77062 BREAST TOMOSYNTHESIS BI: CPT | Performed by: RADIOLOGY

## 2024-06-12 PROCEDURE — 77066 DX MAMMO INCL CAD BI: CPT | Performed by: RADIOLOGY

## 2024-06-17 DIAGNOSIS — N63.20 MASS OF LEFT BREAST, UNSPECIFIED QUADRANT: Primary | ICD-10-CM

## 2024-06-19 ENCOUNTER — TELEPHONE (OUTPATIENT)
Dept: OBSTETRICS AND GYNECOLOGY | Facility: CLINIC | Age: 34
End: 2024-06-19
Payer: COMMERCIAL

## 2024-06-19 NOTE — TELEPHONE ENCOUNTER
----- Message from Alexandra Perez MA sent at 6/19/2024  1:23 PM EDT -----  lvm  ----- Message -----  From: CHAN Tolentino  Sent: 6/17/2024   3:17 PM EDT  To: Alexandra Perez MA    Her mammogram showed a breast mass about 1.8cm. They are saying its a benign fibroadenoma which is a lumpier area of breast tissue. It's recommended that we get another mammogram of the Left breast in 6 months to check that it remains stable. I placed the order already for her     Thanks  Eliz  ----- Message -----  From: Interface, Radiology Results In  Sent: 6/12/2024   2:14 PM EDT  To: CHAN Tolentino

## 2024-07-10 ENCOUNTER — OFFICE VISIT (OUTPATIENT)
Dept: ORTHOPEDIC SURGERY | Facility: CLINIC | Age: 34
End: 2024-07-10
Payer: COMMERCIAL

## 2024-07-10 ENCOUNTER — HOSPITAL ENCOUNTER (OUTPATIENT)
Dept: RADIOLOGY | Facility: CLINIC | Age: 34
Discharge: HOME | End: 2024-07-10
Payer: COMMERCIAL

## 2024-07-10 VITALS — WEIGHT: 152 LBS | HEIGHT: 65 IN | BODY MASS INDEX: 25.33 KG/M2

## 2024-07-10 DIAGNOSIS — S89.91XA RIGHT KNEE INJURY, INITIAL ENCOUNTER: ICD-10-CM

## 2024-07-10 DIAGNOSIS — S83.241A TEAR OF MEDIAL MENISCUS OF RIGHT KNEE, CURRENT, UNSPECIFIED TEAR TYPE, INITIAL ENCOUNTER: Primary | ICD-10-CM

## 2024-07-10 DIAGNOSIS — S83.419A SPRAIN OF MEDIAL COLLATERAL LIGAMENT OF KNEE, INITIAL ENCOUNTER: ICD-10-CM

## 2024-07-10 PROCEDURE — 1036F TOBACCO NON-USER: CPT | Performed by: FAMILY MEDICINE

## 2024-07-10 PROCEDURE — 99214 OFFICE O/P EST MOD 30 MIN: CPT | Performed by: FAMILY MEDICINE

## 2024-07-10 PROCEDURE — 73564 X-RAY EXAM KNEE 4 OR MORE: CPT | Mod: RT

## 2024-07-10 PROCEDURE — L1812 KO ELASTIC W/JOINTS PRE OTS: HCPCS | Performed by: FAMILY MEDICINE

## 2024-07-10 RX ORDER — HYDROCODONE BITARTRATE AND ACETAMINOPHEN 5; 325 MG/1; MG/1
1 TABLET ORAL EVERY 8 HOURS PRN
Qty: 20 TABLET | Refills: 0 | Status: SHIPPED | OUTPATIENT
Start: 2024-07-10 | End: 2024-07-17

## 2024-07-10 ASSESSMENT — PATIENT HEALTH QUESTIONNAIRE - PHQ9
SUM OF ALL RESPONSES TO PHQ9 QUESTIONS 1 AND 2: 0
2. FEELING DOWN, DEPRESSED OR HOPELESS: NOT AT ALL
1. LITTLE INTEREST OR PLEASURE IN DOING THINGS: NOT AT ALL

## 2024-07-10 NOTE — PROGRESS NOTES
Acute Injury New Patient Visit    CC:   Chief Complaint   Patient presents with    Right Knee - Pain     Patient slipped on water, DOI:07/09/24. Right knee medial pain and pain radiates down her leg into her foot       HPI: Nicole is a 33 y.o.female who presents today with new complaints of severe pain and discomfort to the medial side of the right knee.  She states she had slipped on some water that was on the ground at the house.  She states her 1-year-old was playing with the dog water splashing around and then he had went to take out a quesadilla which was in the trash can and as she was trying to go stop him and get a hold of the situation she had twisted the knee and slipped and fell.  She has severe pain to the inside of the knee feels that it is loose and sloppy.  She does not have a brace at home.  She status post ACL reconstruction in 2022 with Dr. Leroy Cuba in addition to prior arthroscopic debridement more recently back in November 2023.        Review of Systems   GENERAL: Negative for malaise, significant weight loss, fever  MUSCULOSKELETAL: See HPI  NEURO: Positive for numbness / tingling     Past Medical History  Past Medical History:   Diagnosis Date    Bipolar disorder, currently in remission, most recent episode unspecified (Multi) 11/06/2020    History of depressed bipolar disorder    Migraine, unspecified, not intractable, without status migrainosus 09/13/2017    Migraines    Other specified anxiety disorders 09/13/2017    Depression with anxiety    Personal history of other mental and behavioral disorders     History of bipolar disorder       Medication review  Medication Documentation Review Audit       Reviewed by Noris Casarez MA (Medical Assistant) on 07/10/24 at 0924      Medication Order Taking? Sig Documenting Provider Last Dose Status   flibanserin 100 mg tablet [id number]  Take 1 tablet by mouth once daily. CHAN Tolentino  Active   hydrOXYzine HCL (Atarax) 25 mg tablet  [id number]  Take 1 tab 1 hour prior to dental procedure Harleen Oviedo MD  Active   SUMAtriptan (Imitrex) 50 mg tablet [id number]  Take 1 tablet (50 mg) by mouth 1 time if needed for migraine. May repeat after 2 hours. Harleen Oviedo MD  Active                    Allergies  Allergies   Allergen Reactions    Naproxen Other     Stomach pain       Social History  Social History     Socioeconomic History    Marital status:      Spouse name: Not on file    Number of children: Not on file    Years of education: Not on file    Highest education level: Not on file   Occupational History    Not on file   Tobacco Use    Smoking status: Never    Smokeless tobacco: Never   Vaping Use    Vaping status: Some Days    Substances: Nicotine    Devices: Disposable   Substance and Sexual Activity    Alcohol use: Never    Drug use: Never    Sexual activity: Not on file   Other Topics Concern    Not on file   Social History Narrative    Not on file     Social Determinants of Health     Financial Resource Strain: Not on file   Food Insecurity: Not on file   Transportation Needs: Not on file   Physical Activity: Not on file   Stress: Not on file   Social Connections: Not on file   Intimate Partner Violence: Not on file   Housing Stability: Not on file       Surgical History  Past Surgical History:   Procedure Laterality Date    OTHER SURGICAL HISTORY  09/13/2017    Breast Surgery Enlargement Procedure    OTHER SURGICAL HISTORY  08/29/2022    Anterior cruciate ligament repair    OTHER SURGICAL HISTORY  08/24/2020    Ovarian cystectomy    OTHER SURGICAL HISTORY  08/24/2020    Appendectomy       Physical Exam:  GENERAL:  Patient is awake, alert, and oriented to person place and time.  Patient appears well nourished and well kept.  Affect Calm, mild to moderately acutely Distressed.  HEENT:  Normocephalic, Atraumatic, EOMI  CARDIOVASCULAR:  Hemodynamically stable.  RESPIRATORY:  Normal respirations with unlabored  breathing.  NEURO: Gross sensation intact to the lower extremities bilaterally.  Extremity: Right knee exam: The affected Right knee was examined and inspected and was tender to touch along the medial joint line and along the expected course of the MCL ligament aspect with minimal catching, locking, or mechanical symptoms.  The skin was intact without breakdown no open cuts wounds or sores were present. Prior incisions if present were healed without issue.  A small effusion was present, with a full intact extensor mechanism.  Range of motion of the knee demonstrated limited flexion to[95] degrees and extension out to [0]degrees.   There was a positive Jose and Modified Appley exam seen with evidence of instability in the [medial/] collateral ligament ligaments.      Special tests of the knee: Lachman was [equivocal], Anterior Drawer was [negative], and Posterior Drawer [negative].  There was no evidence of any foot drop, numbness, tingling, or burning to the lower extremity.  Lower Extremity sensation, distal pulses, and reflexes in the foot and ankle were preserved.  Patient was able to tolerate minimal full weight bearing secondary to discomfort.  The patient's gait was antalgic secondary to this pain.      Diagnostics: X-rays today negative for acute fracture or dislocation        Procedure: None  Procedures    Assessment:   Problem List Items Addressed This Visit       Tear of meniscus of right knee - Primary    Relevant Medications    HYDROcodone-acetaminophen (Norco) 5-325 mg tablet    Other Relevant Orders    MR knee right wo IV contrast    Knee Brace, Hinged     Other Visit Diagnoses       Right knee injury, initial encounter        Relevant Medications    HYDROcodone-acetaminophen (Norco) 5-325 mg tablet    Other Relevant Orders    XR knee right 4+ views    MR knee right wo IV contrast    Knee Brace, Hinged    Sprain of medial collateral ligament of knee, initial encounter        Relevant Medications     HYDROcodone-acetaminophen (Norco) 5-325 mg tablet    Other Relevant Orders    MR knee right wo IV contrast    Knee Brace, Hinged             Plan: At this time we discussed the concerning signs and exam here today.  Will obtain updated MRI of the right knee for further evaluation of potential internal derangement with concern for the MCL and the medial meniscus.  Will offer her simple hinged knee brace for support as she has no brace from previous surgeries at home.  She states that she would likely not utilize crutches so we will hold off on that for now.  Pain medication was prescribed for her 8 out of 10 pain.  OARRS was checked and okay.  She will continue with light weightbearing as tolerated and limiting any high risk activities until seen in follow-up.  She will follow-up with her surgeon Dr. Leroy Cuba going forward once the MRI is completed.  She acknowledged understanding and agreement to the plan as above.  Orders Placed This Encounter    Knee Brace, Hinged    XR knee right 4+ views    MR knee right wo IV contrast    HYDROcodone-acetaminophen (Norco) 5-325 mg tablet      At the conclusion of the visit there were no further questions by the patient/family regarding their plan of care.  Patient was instructed to call or return with any issues, questions, or concerns regarding their injury and/or treatment plan described above.     07/10/24 at 10:12 AM - Cole C Budinsky, MD    Office: [phone]    This note was prepared using voice recognition software.  The details of this note are correct and have been reviewed, and corrected to the best of my ability.  Some grammatical errors may persist related to the Dragon software.

## 2024-07-24 ENCOUNTER — HOSPITAL ENCOUNTER (OUTPATIENT)
Dept: RADIOLOGY | Facility: HOSPITAL | Age: 34
Discharge: HOME | End: 2024-07-24
Payer: COMMERCIAL

## 2024-07-24 ENCOUNTER — APPOINTMENT (OUTPATIENT)
Dept: ORTHOPEDIC SURGERY | Facility: CLINIC | Age: 34
End: 2024-07-24
Payer: COMMERCIAL

## 2024-07-24 DIAGNOSIS — S83.419A SPRAIN OF MEDIAL COLLATERAL LIGAMENT OF KNEE, INITIAL ENCOUNTER: ICD-10-CM

## 2024-07-24 DIAGNOSIS — S89.91XA RIGHT KNEE INJURY, INITIAL ENCOUNTER: ICD-10-CM

## 2024-07-24 DIAGNOSIS — S83.241A TEAR OF MEDIAL MENISCUS OF RIGHT KNEE, CURRENT, UNSPECIFIED TEAR TYPE, INITIAL ENCOUNTER: ICD-10-CM

## 2024-07-24 PROCEDURE — 73721 MRI JNT OF LWR EXTRE W/O DYE: CPT | Mod: RIGHT SIDE | Performed by: STUDENT IN AN ORGANIZED HEALTH CARE EDUCATION/TRAINING PROGRAM

## 2024-07-24 PROCEDURE — 73721 MRI JNT OF LWR EXTRE W/O DYE: CPT | Mod: RT

## 2024-07-30 ENCOUNTER — OFFICE VISIT (OUTPATIENT)
Dept: ORTHOPEDIC SURGERY | Facility: CLINIC | Age: 34
End: 2024-07-30
Payer: COMMERCIAL

## 2024-07-30 DIAGNOSIS — M23.91 INTERNAL DERANGEMENT OF RIGHT KNEE: Primary | ICD-10-CM

## 2024-07-30 PROCEDURE — 99214 OFFICE O/P EST MOD 30 MIN: CPT | Performed by: ORTHOPAEDIC SURGERY

## 2024-07-30 PROCEDURE — 1036F TOBACCO NON-USER: CPT | Performed by: ORTHOPAEDIC SURGERY

## 2024-07-30 PROCEDURE — 99214 OFFICE O/P EST MOD 30 MIN: CPT | Mod: 57 | Performed by: ORTHOPAEDIC SURGERY

## 2024-07-30 NOTE — PROGRESS NOTES
History of Present Illness:   Patient presents today for follow-up evaluation of the right knee after acute injury several days ago as she planted and twisted knee and felt instability event of her patellofemoral joint.  She has had these episodes in the past but they becoming more frequent status post ACL reconstruction as well as chondroplasty partial meniscectomy.  She notes she can feel the kneecap move and shift especially when she does a planting twisting motion.  She is very physically active with her young child.  Endorses occasional catching, locking and giving out.    Review of Systems   GENERAL: Negative for malaise, significant weight loss, fever  MUSCULOSKELETAL: see HPI  NEURO:  Negative    Physical Examination:  Right Knee:  Skin healthy and intact  No gross swelling or ecchymosis  No varus malalignment  No valgus malalignment   No effusion  ROM:  Full flexion   Full extension  No pain with internal rotation of the hip    No tenderness to palpation over medial joint line  No tenderness to palpation over lateral joint line  No laxity to valgus stress  No laxity to varus stress  Negative Lachman´s test  Negative anterior drawer test  Negative posterior drawer test  Negative Jose´s test    Neurovascular exam normal distally     Imaging:  Suggestion of osseous contusions of the medial femoral condyle and  possibly the medial femoral trochlea. No fracture lines.      Status post anterior cruciate ligament reconstruction with additional  artifact in the region of the lateral femoral condyle. No evident  complication. Partial medial meniscectomy is also suspected.    There is some thickening with irregularity and likely scar tissue overgrowth of the medial patellofemoral ligament undersurface.    History of partial meniscectomy from 2/18/2022 with prior ACL reconstruction on 8/16/2021    Assessment:   Patient with right knee pain and mechanical symptoms concern for patellofemoral  instability    Plan:  We discussed a variety of treatment option for the patient with any rest, ice, anti-inflammatories as well as corticosteroid injection and possible arthroscopic chondroplasty with resection of overgrowth medial patellofemoral tissue.    Rolando Castanon PA-C    In a face to face encounter, I evaluated the patient and performed a physical examination, discussed pertinent diagnostic studies if indicated and discussed diagnosis and management strategies with both the patient and physician assistant / nurse practitioner.  I reviewed the PA/NP's note and agree with the documented findings and plan of care.    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  She does have synovial overgrowth in that region and likely a prominent plica which may be also contributing to her mechanical symptoms.  Based on significant discomfort and difficulty and decent relief with prior arthroscopy do feel chondroplasty possible plica excision as well as exam under anesthesia to evaluate for patellar stability and tracking is a reasonable neck step.    Leroy Cuba MD

## 2024-07-30 NOTE — H&P
History Of Present Illness  Nicole Nunes is a 33 y.o. female presenting with right knee pain with mechanical symptoms.     Past Medical History  She has a past medical history of Bipolar disorder, currently in remission, most recent episode unspecified (Multi) (11/06/2020), Migraine, unspecified, not intractable, without status migrainosus (09/13/2017), Other specified anxiety disorders (09/13/2017), and Personal history of other mental and behavioral disorders.    Surgical History  She has a past surgical history that includes Other surgical history (09/13/2017); Other surgical history (08/29/2022); Other surgical history (08/24/2020); and Other surgical history (08/24/2020).     Social History  She reports that she has never smoked. She has never used smokeless tobacco. She reports that she does not drink alcohol and does not use drugs.    Family History  Family History   Problem Relation Name Age of Onset    Other (malignant neoplasm of prostate) Maternal Grandmother      Breast cancer Father's Sister          Allergies  Naproxen    Review of Systems CONSTITUTIONAL: Denies weight loss, fever and chills.    - HEENT: Denies changes in vision and hearing.    - RESPIRATORY: Denies SOB and cough.    - CV: Denies palpitations and CP.    - GI: Denies abdominal pain, nausea, vomiting and diarrhea.    - : Denies dysuria and urinary frequency.    - MSK: See physical exam findings     - SKIN: Denies rash and pruritus.    - NEUROLOGICAL: Denies headache and syncope.    - PSYCHIATRIC: Denies recent changes in mood. Denies anxiety and depression.     Physical Exam- GENERAL: Alert and oriented x 3. No acute distress. Well-nourished.    - EYES: EOMI. Anicteric.    - HENT: Moist mucous membranes. No scleral icterus. No cervical lymphadenopathy.    - LUNGS: Clear to auscultation bilaterally. No accessory muscle use.    - CARDIOVASCULAR: Regular rate and rhythm. No murmur. No JVD.    - ABDOMEN: Soft, non-tender and  non-distended. No palpable masses.    - EXTREMITIES: (+) McMurrays      - NEUROLOGIC: No focal neurological deficits. CN II-XII grossly intact, but not individually tested.    - PSYCHIATRIC: Cooperative. Appropriate mood and affect.     Last Recorded Vitals  There were no vitals taken for this visit.    Relevant Results      Scheduled medications    Continuous medications    PRN medications    No results found for this or any previous visit (from the past 24 hour(s)).    Assessment/Plan   There are no diagnoses linked to this encounter.    We discussed right knee arthroscopy with chondroplasty of the patella as well as plica excision, she is agreeable.        I spent 10 minutes in the professional and overall care of this patient.      Rolando Castanon PA-C

## 2024-07-31 ENCOUNTER — LAB (OUTPATIENT)
Dept: LAB | Facility: HOSPITAL | Age: 34
End: 2024-07-31
Payer: COMMERCIAL

## 2024-07-31 DIAGNOSIS — Z01.818 PREOP TESTING: ICD-10-CM

## 2024-07-31 LAB
ALBUMIN SERPL BCP-MCNC: 4.5 G/DL (ref 3.4–5)
ALP SERPL-CCNC: 58 U/L (ref 33–110)
ALT SERPL W P-5'-P-CCNC: 10 U/L (ref 7–45)
ANION GAP SERPL CALC-SCNC: 11 MMOL/L (ref 10–20)
AST SERPL W P-5'-P-CCNC: 12 U/L (ref 9–39)
BASOPHILS # BLD AUTO: 0.03 X10*3/UL (ref 0–0.1)
BASOPHILS NFR BLD AUTO: 0.3 %
BILIRUB SERPL-MCNC: 0.7 MG/DL (ref 0–1.2)
BUN SERPL-MCNC: 13 MG/DL (ref 6–23)
CALCIUM SERPL-MCNC: 9.4 MG/DL (ref 8.6–10.3)
CHLORIDE SERPL-SCNC: 106 MMOL/L (ref 98–107)
CO2 SERPL-SCNC: 25 MMOL/L (ref 21–32)
CREAT SERPL-MCNC: 0.66 MG/DL (ref 0.5–1.05)
EGFRCR SERPLBLD CKD-EPI 2021: >90 ML/MIN/1.73M*2
EOSINOPHIL # BLD AUTO: 0.09 X10*3/UL (ref 0–0.7)
EOSINOPHIL NFR BLD AUTO: 1 %
ERYTHROCYTE [DISTWIDTH] IN BLOOD BY AUTOMATED COUNT: 13.2 % (ref 11.5–14.5)
GLUCOSE SERPL-MCNC: 115 MG/DL (ref 74–99)
HCT VFR BLD AUTO: 36.2 % (ref 36–46)
HGB BLD-MCNC: 11.9 G/DL (ref 12–16)
IMM GRANULOCYTES # BLD AUTO: 0.04 X10*3/UL (ref 0–0.7)
IMM GRANULOCYTES NFR BLD AUTO: 0.4 % (ref 0–0.9)
LYMPHOCYTES # BLD AUTO: 3.48 X10*3/UL (ref 1.2–4.8)
LYMPHOCYTES NFR BLD AUTO: 36.9 %
MCH RBC QN AUTO: 28.4 PG (ref 26–34)
MCHC RBC AUTO-ENTMCNC: 32.9 G/DL (ref 32–36)
MCV RBC AUTO: 86 FL (ref 80–100)
MONOCYTES # BLD AUTO: 0.5 X10*3/UL (ref 0.1–1)
MONOCYTES NFR BLD AUTO: 5.3 %
NEUTROPHILS # BLD AUTO: 5.3 X10*3/UL (ref 1.2–7.7)
NEUTROPHILS NFR BLD AUTO: 56.1 %
NRBC BLD-RTO: 0 /100 WBCS (ref 0–0)
PLATELET # BLD AUTO: 349 X10*3/UL (ref 150–450)
POTASSIUM SERPL-SCNC: 4 MMOL/L (ref 3.5–5.3)
PROT SERPL-MCNC: 7.1 G/DL (ref 6.4–8.2)
RBC # BLD AUTO: 4.19 X10*6/UL (ref 4–5.2)
SODIUM SERPL-SCNC: 138 MMOL/L (ref 136–145)
WBC # BLD AUTO: 9.4 X10*3/UL (ref 4.4–11.3)

## 2024-07-31 PROCEDURE — 36415 COLL VENOUS BLD VENIPUNCTURE: CPT

## 2024-07-31 PROCEDURE — 85025 COMPLETE CBC W/AUTO DIFF WBC: CPT

## 2024-07-31 PROCEDURE — 84075 ASSAY ALKALINE PHOSPHATASE: CPT

## 2024-08-08 DIAGNOSIS — G89.18 POST-OP PAIN: Primary | ICD-10-CM

## 2024-08-08 RX ORDER — ASPIRIN 81 MG/1
81 TABLET ORAL 2 TIMES DAILY
Qty: 28 TABLET | Refills: 0 | Status: SHIPPED | OUTPATIENT
Start: 2024-08-08 | End: 2024-08-22

## 2024-08-08 RX ORDER — HYDROCODONE BITARTRATE AND ACETAMINOPHEN 5; 325 MG/1; MG/1
1 TABLET ORAL EVERY 6 HOURS PRN
Qty: 12 TABLET | Refills: 0 | Status: SHIPPED | OUTPATIENT
Start: 2024-08-08 | End: 2024-08-11

## 2024-08-09 PROCEDURE — 29875 ARTHRS KNEE SURG SYNVCT LMTD: CPT | Performed by: ORTHOPAEDIC SURGERY

## 2024-08-28 ENCOUNTER — OFFICE VISIT (OUTPATIENT)
Dept: ORTHOPEDIC SURGERY | Facility: CLINIC | Age: 34
End: 2024-08-28
Payer: COMMERCIAL

## 2024-08-28 DIAGNOSIS — M23.91 INTERNAL DERANGEMENT OF RIGHT KNEE: Primary | ICD-10-CM

## 2024-08-28 PROCEDURE — 99211 OFF/OP EST MAY X REQ PHY/QHP: CPT | Performed by: ORTHOPAEDIC SURGERY

## 2024-08-28 PROCEDURE — 1036F TOBACCO NON-USER: CPT | Performed by: ORTHOPAEDIC SURGERY

## 2024-08-28 PROCEDURE — 99024 POSTOP FOLLOW-UP VISIT: CPT | Performed by: ORTHOPAEDIC SURGERY

## 2024-08-28 NOTE — PROGRESS NOTES
History of Present Illness:  Patient returns today noting minimal pain.  Denies any calf pain or shortness of breath.    Physical Examination:   Mild effusion  Healthy incisions - no active drainage  Good range of motion  No calf swelling  Negative Marty´s test  Distal neurovascular exam intact    Operative Findings:  Grade 2 changes patella    Assessment:  Patient status post right knee arthroscopy chondroplasty patella, plica excision, doing well    Plan:  Reviewed arthroscopic photos and findings.  Discussed short and long term implications for the knee.  Discussed analgesics, ice, rest.  Encouraged home exercise program, physical therapy.

## 2024-09-10 ENCOUNTER — HOSPITAL ENCOUNTER (OUTPATIENT)
Dept: RADIOLOGY | Facility: HOSPITAL | Age: 34
Discharge: HOME | End: 2024-09-10
Payer: COMMERCIAL

## 2024-09-10 ENCOUNTER — OFFICE VISIT (OUTPATIENT)
Dept: ORTHOPEDIC SURGERY | Facility: CLINIC | Age: 34
End: 2024-09-10
Payer: COMMERCIAL

## 2024-09-10 DIAGNOSIS — M79.89 CALF SWELLING: ICD-10-CM

## 2024-09-10 DIAGNOSIS — G89.18 POST-OP PAIN: ICD-10-CM

## 2024-09-10 DIAGNOSIS — G89.18 POST-OP PAIN: Primary | ICD-10-CM

## 2024-09-10 PROCEDURE — 93971 EXTREMITY STUDY: CPT | Performed by: STUDENT IN AN ORGANIZED HEALTH CARE EDUCATION/TRAINING PROGRAM

## 2024-09-10 PROCEDURE — 99211 OFF/OP EST MAY X REQ PHY/QHP: CPT | Mod: 25 | Performed by: FAMILY MEDICINE

## 2024-09-10 PROCEDURE — 1036F TOBACCO NON-USER: CPT | Performed by: FAMILY MEDICINE

## 2024-09-10 PROCEDURE — 93971 EXTREMITY STUDY: CPT

## 2024-09-10 PROCEDURE — 99024 POSTOP FOLLOW-UP VISIT: CPT | Performed by: FAMILY MEDICINE

## 2024-09-10 RX ORDER — CYCLOBENZAPRINE HCL 10 MG
10 TABLET ORAL NIGHTLY PRN
Qty: 14 TABLET | Refills: 0 | Status: SHIPPED | OUTPATIENT
Start: 2024-09-10 | End: 2024-09-24

## 2024-09-10 RX ORDER — METHYLPREDNISOLONE 4 MG/1
TABLET ORAL
Qty: 1 EACH | Refills: 0 | Status: SHIPPED | OUTPATIENT
Start: 2024-09-10

## 2024-09-10 NOTE — PROGRESS NOTES
Established Patient Follow-Up Visit    CC: No chief complaint on file.      HPI:  Nicole is a 34 y.o. female returns here today for follow-up visit regarding: Acute onset of significant pain and discomfort to the right calf medial side and the medial side of her knee joint.  She states yesterday when she went to get up off the couch she felt significant amount of pain.  She has had difficulty tolerating full weightbearing she has numbness and tingling in the toes she has been icing elevating resting and is in a significant amount of pain here today.  She denies any slip trip or fall.  She states she was just simply on the couch and playing around with her children when she went to get up had the significant discomfort.  She did not think much of it thought it was just going to be small strain and it would go away however when she woke this morning she was unable to tolerate full weightbearing.  She is 4 weeks status post chondroplasty of the patella and plica plica excision.  She is also previously status post ACL reconstruction and has had multiple meniscus tears in the past.          REVIEW OF SYSTEMS:  GENERAL: Negative for malaise, significant weight loss, fever  MUSCULOSKELETAL: See HPI  NEURO: Positive for numbness / tingling       PHYSICAL EXAM:  -Neuro: Gross sensation intact to the lower extremities bilaterally.  -Extremity: Right lower extremity demonstrates skin which is warm pink well-perfused the medial gastroc is tender to palpation there is some fullness there is less tenderness to palpation over the lateral gastroc.  She has an intact flexion extension about the ankle.  Extensor mechanism about the knee is intact lacking about 5 degrees.  Flexion is limited to 90.  Tenderness over the medial joint line and soft tissues.  Soft tissue swelling is noted no obvious effusion present however.  Positive Jose and Apley test today.  No obvious laxity with valgus stress.    IMAGING: No new imaging  today      PROCEDURE: None  Procedures     ASSESSMENT:   Follow-up visit for:  Problem List Items Addressed This Visit    None  Visit Diagnoses       Post-op pain    -  Primary    Relevant Medications    methylPREDNISolone (Medrol Dospak) 4 mg tablets    cyclobenzaprine (Flexeril) 10 mg tablet    Other Relevant Orders    Lower extremity venous duplex right    Calf swelling        Relevant Medications    methylPREDNISolone (Medrol Dospak) 4 mg tablets    cyclobenzaprine (Flexeril) 10 mg tablet    Other Relevant Orders    Lower extremity venous duplex right             PLAN: At this time discussed with patient out of an abundance of caution due to the numbness and tingling of the foot as well as the pain to the calf we will offer her a stat DVT ultrasound to rule out clot.  Additionally to assist with her swelling and pain we will offer her an oral steroid pack and a muscle relaxer.  She will follow-up in 2 days with Dr. Leroy Cuba as she was able to schedule a closer visit on her way out.  Discussed with the patient that x-rays would not likely to be helpful to assist for today and that a repeat MRI would be way too soon and will defer any further advanced imaging with Dr. Leroy Cuba.  Patient acknowledged understanding and agreement.  Orders Placed This Encounter    Lower extremity venous duplex right    methylPREDNISolone (Medrol Dospak) 4 mg tablets    cyclobenzaprine (Flexeril) 10 mg tablet           At the conclusion of the visit there were no further questions by the patient/family regarding their plan of care.  Patient was instructed to call or return with any issues, questions, or concerns regarding their injury and/or treatment plan described above.     09/10/24 at 10:27 AM - Cole C Budinsky, MD    Office: [phone]    This note was prepared using voice recognition software.  The details of this note are correct and have been reviewed, and corrected to the best of my ability.  Some grammatical errors  may persist related to the Dragon software.

## 2024-09-12 ENCOUNTER — OFFICE VISIT (OUTPATIENT)
Dept: ORTHOPEDIC SURGERY | Facility: CLINIC | Age: 34
End: 2024-09-12
Payer: COMMERCIAL

## 2024-09-12 DIAGNOSIS — S83.419A SPRAIN OF MEDIAL COLLATERAL LIGAMENT OF KNEE, INITIAL ENCOUNTER: Primary | ICD-10-CM

## 2024-09-12 PROCEDURE — 1036F TOBACCO NON-USER: CPT | Performed by: ORTHOPAEDIC SURGERY

## 2024-09-12 PROCEDURE — 99024 POSTOP FOLLOW-UP VISIT: CPT | Performed by: ORTHOPAEDIC SURGERY

## 2024-09-12 NOTE — PROGRESS NOTES
History of Present Illness:   Patient presents today with increased pain along the medial aspect of her right knee after standing up from a seated position. She denies nay pop or clicks, but did endorse some difficulty weightbearing as soon as she woke up the following day. No significant mechanical symptoms today.       Review of Systems   GENERAL: Negative for malaise, significant weight loss, fever  MUSCULOSKELETAL: see HPI  NEURO:  Negative    Physical Examination:  Mild effusion  Healthy incisions - no active drainage  Good range of motion  No calf swelling  Negative Marty´s test  Distal neurovascular exam intact    Imaging:  Patient status post right knee arthroscopy chondroplasty patella, plica excision, doing well     Assessment:   Pt with right knee pain likely aggravation of scar tissue    Plan:  We encouraged NSAIDs if able, rest, ice, and elevation as well as formal PT . We do think PT will help her regain some range of motion and strength. Low clinical suspicion for re-injury.     Rolando Castanon PA-C      In a face to face encounter, I evaluated the patient and performed a physical examination, discussed pertinent diagnostic studies if indicated and discussed diagnosis and management strategies with both the patient and physician assistant / nurse practitioner.  I reviewed the PA/NP's note and agree with the documented findings and plan of care.        Leroy Cuba MD

## 2024-09-19 ENCOUNTER — APPOINTMENT (OUTPATIENT)
Dept: ORTHOPEDIC SURGERY | Facility: CLINIC | Age: 34
End: 2024-09-19
Payer: COMMERCIAL

## 2024-10-30 ENCOUNTER — OFFICE VISIT (OUTPATIENT)
Dept: ORTHOPEDIC SURGERY | Facility: CLINIC | Age: 34
End: 2024-10-30
Payer: COMMERCIAL

## 2024-10-30 ENCOUNTER — HOSPITAL ENCOUNTER (OUTPATIENT)
Dept: RADIOLOGY | Facility: CLINIC | Age: 34
Discharge: HOME | End: 2024-10-30
Payer: COMMERCIAL

## 2024-10-30 DIAGNOSIS — M79.671 RIGHT FOOT PAIN: ICD-10-CM

## 2024-10-30 DIAGNOSIS — S92.911A CLOSED NONDISPLACED FRACTURE OF PHALANX OF TOE OF RIGHT FOOT, UNSPECIFIED TOE, INITIAL ENCOUNTER: Primary | ICD-10-CM

## 2024-10-30 PROCEDURE — 73630 X-RAY EXAM OF FOOT: CPT | Mod: RT

## 2024-10-30 PROCEDURE — 28510 TREATMENT OF TOE FRACTURE: CPT | Performed by: INTERNAL MEDICINE

## 2024-10-30 PROCEDURE — 73630 X-RAY EXAM OF FOOT: CPT | Mod: RIGHT SIDE | Performed by: INTERNAL MEDICINE

## 2024-10-30 PROCEDURE — 99213 OFFICE O/P EST LOW 20 MIN: CPT | Performed by: INTERNAL MEDICINE

## 2024-10-30 PROCEDURE — 99214 OFFICE O/P EST MOD 30 MIN: CPT | Mod: 57,25 | Performed by: INTERNAL MEDICINE

## 2024-10-30 PROCEDURE — L4361 PNEUMA/VAC WALK BOOT PRE OTS: HCPCS | Performed by: INTERNAL MEDICINE

## 2024-11-14 ENCOUNTER — APPOINTMENT (OUTPATIENT)
Dept: ORTHOPEDIC SURGERY | Facility: CLINIC | Age: 34
End: 2024-11-14
Payer: COMMERCIAL

## 2024-12-17 ENCOUNTER — HOSPITAL ENCOUNTER (OUTPATIENT)
Dept: RADIOLOGY | Facility: HOSPITAL | Age: 34
Discharge: HOME | End: 2024-12-17
Payer: COMMERCIAL

## 2024-12-17 DIAGNOSIS — N63.20 MASS OF LEFT BREAST, UNSPECIFIED QUADRANT: ICD-10-CM

## 2024-12-17 DIAGNOSIS — D24.1 FIBROADENOMA OF RIGHT BREAST: Primary | ICD-10-CM

## 2024-12-17 PROCEDURE — 76642 ULTRASOUND BREAST LIMITED: CPT | Mod: LT

## 2024-12-17 PROCEDURE — 76642 ULTRASOUND BREAST LIMITED: CPT | Mod: LEFT SIDE | Performed by: RADIOLOGY

## 2025-01-08 ENCOUNTER — OFFICE VISIT (OUTPATIENT)
Dept: SURGERY | Facility: HOSPITAL | Age: 35
End: 2025-01-08
Payer: COMMERCIAL

## 2025-01-08 VITALS — HEIGHT: 65 IN | BODY MASS INDEX: 26.99 KG/M2 | WEIGHT: 162 LBS

## 2025-01-08 DIAGNOSIS — Z80.3 FAMILY HISTORY OF BREAST CANCER: Primary | ICD-10-CM

## 2025-01-08 DIAGNOSIS — D24.1 FIBROADENOMA OF RIGHT BREAST: ICD-10-CM

## 2025-01-08 PROCEDURE — 99204 OFFICE O/P NEW MOD 45 MIN: CPT | Performed by: SURGERY

## 2025-01-08 PROCEDURE — 3008F BODY MASS INDEX DOCD: CPT | Performed by: SURGERY

## 2025-01-08 PROCEDURE — 1036F TOBACCO NON-USER: CPT | Performed by: SURGERY

## 2025-01-08 PROCEDURE — 99214 OFFICE O/P EST MOD 30 MIN: CPT | Performed by: SURGERY

## 2025-01-08 RX ORDER — CEFAZOLIN SODIUM 2 G/100ML
2 INJECTION, SOLUTION INTRAVENOUS ONCE
OUTPATIENT
Start: 2025-01-08 | End: 2025-01-08

## 2025-01-08 ASSESSMENT — ENCOUNTER SYMPTOMS
PSYCHIATRIC NEGATIVE: 1
EYES NEGATIVE: 1
GASTROINTESTINAL NEGATIVE: 1
ALLERGIC/IMMUNOLOGIC NEGATIVE: 1
ENDOCRINE NEGATIVE: 1
RESPIRATORY NEGATIVE: 1
MUSCULOSKELETAL NEGATIVE: 1
CONSTITUTIONAL NEGATIVE: 1
HEMATOLOGIC/LYMPHATIC NEGATIVE: 1
NEUROLOGICAL NEGATIVE: 1
CARDIOVASCULAR NEGATIVE: 1

## 2025-01-08 NOTE — PROGRESS NOTES
Subjective   Patient ID: Nicole Nunes is a 34 y.o. female who presents for New Patient Visit (Abnormal imaging).  HPI  1 year history of appreciated infraareolar left mass.  Ultrasound 12/17/2024 demonstrates in the 5 o'clock position 3 cm from the nipple stable from comparison 6/12/2024: 1.5 x 0.6 x 1.8 cm hypoechoic solid mass most consistent with fibroadenoma.  No change in size since first appreciated, no overlying skin change.  Markedly tender to palpation since first appreciated.  Lifelong history of left nipple inversion.    History of bilateral submuscular breast implants.    Breast history:  G1, P1 age 32  No breast-feeding  Family history: Paternal aunt and paternal great aunt with breast cancer.  Maternal grandmother with ovarian, esophageal, stomach cancer.  No genetics performed.  Patient asking about genetic testing.    Review of Systems   Constitutional: Negative.    HENT: Negative.     Eyes: Negative.    Respiratory: Negative.     Cardiovascular: Negative.    Gastrointestinal: Negative.    Endocrine: Negative.    Genitourinary: Negative.    Musculoskeletal: Negative.    Skin: Negative.    Allergic/Immunologic: Negative.    Neurological: Negative.    Hematological: Negative.    Psychiatric/Behavioral: Negative.           Past Medical History:   Diagnosis Date    Bipolar disorder, currently in remission, most recent episode unspecified (Multi) 11/06/2020    History of depressed bipolar disorder    Migraine, unspecified, not intractable, without status migrainosus 09/13/2017    Migraines    Other specified anxiety disorders 09/13/2017    Depression with anxiety    Personal history of other mental and behavioral disorders     History of bipolar disorder     Past Surgical History:   Procedure Laterality Date    OTHER SURGICAL HISTORY  09/13/2017    Breast Surgery Enlargement Procedure    OTHER SURGICAL HISTORY  08/29/2022    Anterior cruciate ligament repair    OTHER SURGICAL HISTORY  08/24/2020     Ovarian cystectomy    OTHER SURGICAL HISTORY  08/24/2020    Appendectomy     Family History   Problem Relation Name Age of Onset    Breast cancer Father's Sister      Esophageal cancer Maternal Grandmother      Ovarian cancer Maternal Grandmother      Stomach cancer Maternal Grandmother      Other (paternal great aunt breast cancer) Other        Social History     Socioeconomic History    Marital status:    Tobacco Use    Smoking status: Never    Smokeless tobacco: Never   Vaping Use    Vaping status: Never Used   Substance and Sexual Activity    Alcohol use: Never    Drug use: Never    Sexual activity: Defer          Current Outpatient Medications:     cyclobenzaprine (Flexeril) 10 mg tablet, Take 1 tablet (10 mg) by mouth as needed at bedtime for muscle spasms for up to 14 days., Disp: 14 tablet, Rfl: 0    flibanserin 100 mg tablet, Take 1 tablet by mouth once daily., Disp: 30 tablet, Rfl: 11    hydrOXYzine HCL (Atarax) 25 mg tablet, Take 1 tab 1 hour prior to dental procedure, Disp: 1 tablet, Rfl: 0    methylPREDNISolone (Medrol Dospak) 4 mg tablets, Follow schedule on package instructions, Disp: 1 each, Rfl: 0    SUMAtriptan (Imitrex) 50 mg tablet, Take 1 tablet (50 mg) by mouth 1 time if needed for migraine. May repeat after 2 hours., Disp: 27 tablet, Rfl: 1     Objective   There were no vitals filed for this visit.   Physical Exam  Constitutional:       General: She is not in acute distress.     Appearance: Normal appearance. She is normal weight. She is not toxic-appearing or diaphoretic.   HENT:      Head: Normocephalic and atraumatic.      Mouth/Throat:      Mouth: Mucous membranes are moist.   Eyes:      Conjunctiva/sclera: Conjunctivae normal.      Pupils: Pupils are equal, round, and reactive to light.   Cardiovascular:      Rate and Rhythm: Normal rate and regular rhythm.   Pulmonary:      Effort: Pulmonary effort is normal. No respiratory distress.   Chest:      Chest wall: No mass, swelling or  tenderness.   Breasts:     Right: Normal. No mass, nipple discharge, skin change or tenderness.      Left: Inverted nipple and mass present. No nipple discharge, skin change or tenderness.          Comments: 1 cm mobile tender mass in the 5 o'clock position 1 cm from the areolar edge.  No overlying skin change.  Abdominal:      General: Abdomen is flat. There is no distension.      Palpations: Abdomen is soft. There is no mass.      Tenderness: There is no abdominal tenderness.      Hernia: No hernia is present.   Musculoskeletal:         General: No swelling. Normal range of motion.      Cervical back: Normal range of motion.   Lymphadenopathy:      Cervical: No cervical adenopathy.      Upper Body:      Right upper body: No axillary adenopathy.      Left upper body: No axillary adenopathy.   Skin:     General: Skin is warm and dry.   Neurological:      General: No focal deficit present.      Mental Status: She is alert. Mental status is at baseline.   Psychiatric:         Mood and Affect: Mood normal.         Behavior: Behavior normal.         Thought Content: Thought content normal.         Judgment: Judgment normal.           Assessment/Plan   Problem List Items Addressed This Visit    None  Visit Diagnoses         Codes    Fibroadenoma of right breast     D24.1          Etiology and management of fibroadenomas discussed, less likely alternate pathologic process.  In light of its presence and marked tenderness, we will proceed with excisional biopsy.  Indications for procedure discussed as well as risks of bleeding, infection, need for additional procedures, anesthesia complications.  Understands wishes to proceed.    Genetics referral    Ruth Salazar MD

## 2025-01-23 ENCOUNTER — HOSPITAL ENCOUNTER (OUTPATIENT)
Facility: HOSPITAL | Age: 35
Setting detail: OUTPATIENT SURGERY
Discharge: HOME | End: 2025-01-23
Attending: SURGERY | Admitting: SURGERY
Payer: COMMERCIAL

## 2025-01-23 ENCOUNTER — ANESTHESIA EVENT (OUTPATIENT)
Dept: OPERATING ROOM | Facility: HOSPITAL | Age: 35
End: 2025-01-23
Payer: COMMERCIAL

## 2025-01-23 ENCOUNTER — ANESTHESIA (OUTPATIENT)
Dept: OPERATING ROOM | Facility: HOSPITAL | Age: 35
End: 2025-01-23
Payer: COMMERCIAL

## 2025-01-23 VITALS
WEIGHT: 158.73 LBS | TEMPERATURE: 96.8 F | HEIGHT: 65 IN | SYSTOLIC BLOOD PRESSURE: 100 MMHG | HEART RATE: 71 BPM | RESPIRATION RATE: 16 BRPM | BODY MASS INDEX: 26.45 KG/M2 | DIASTOLIC BLOOD PRESSURE: 63 MMHG | OXYGEN SATURATION: 100 %

## 2025-01-23 DIAGNOSIS — N63.20 MASS OF LEFT BREAST, UNSPECIFIED QUADRANT: ICD-10-CM

## 2025-01-23 DIAGNOSIS — D24.1 FIBROADENOMA OF RIGHT BREAST: ICD-10-CM

## 2025-01-23 DIAGNOSIS — Z80.3 FAMILY HISTORY OF BREAST CANCER: Primary | ICD-10-CM

## 2025-01-23 LAB — PREGNANCY TEST URINE, POC: NEGATIVE

## 2025-01-23 PROCEDURE — 3600000003 HC OR TIME - INITIAL BASE CHARGE - PROCEDURE LEVEL THREE: Performed by: SURGERY

## 2025-01-23 PROCEDURE — 2500000001 HC RX 250 WO HCPCS SELF ADMINISTERED DRUGS (ALT 637 FOR MEDICARE OP): Performed by: STUDENT IN AN ORGANIZED HEALTH CARE EDUCATION/TRAINING PROGRAM

## 2025-01-23 PROCEDURE — 2500000005 HC RX 250 GENERAL PHARMACY W/O HCPCS: Performed by: SURGERY

## 2025-01-23 PROCEDURE — 2720000007 HC OR 272 NO HCPCS: Performed by: SURGERY

## 2025-01-23 PROCEDURE — 7100000010 HC PHASE TWO TIME - EACH INCREMENTAL 1 MINUTE: Performed by: SURGERY

## 2025-01-23 PROCEDURE — 2500000004 HC RX 250 GENERAL PHARMACY W/ HCPCS (ALT 636 FOR OP/ED): Mod: JZ | Performed by: SURGERY

## 2025-01-23 PROCEDURE — 19101 BIOPSY OF BREAST OPEN: CPT | Performed by: SURGERY

## 2025-01-23 PROCEDURE — 3600000008 HC OR TIME - EACH INCREMENTAL 1 MINUTE - PROCEDURE LEVEL THREE: Performed by: SURGERY

## 2025-01-23 PROCEDURE — 7100000009 HC PHASE TWO TIME - INITIAL BASE CHARGE: Performed by: SURGERY

## 2025-01-23 PROCEDURE — 0753T DGTZ GLS MCRSCP SLD LEVEL IV: CPT | Mod: TC,ELYLAB | Performed by: SURGERY

## 2025-01-23 PROCEDURE — 3700000002 HC GENERAL ANESTHESIA TIME - EACH INCREMENTAL 1 MINUTE: Performed by: SURGERY

## 2025-01-23 PROCEDURE — 2500000004 HC RX 250 GENERAL PHARMACY W/ HCPCS (ALT 636 FOR OP/ED): Performed by: NURSE ANESTHETIST, CERTIFIED REGISTERED

## 2025-01-23 PROCEDURE — 81025 URINE PREGNANCY TEST: CPT | Performed by: SURGERY

## 2025-01-23 PROCEDURE — 3700000001 HC GENERAL ANESTHESIA TIME - INITIAL BASE CHARGE: Performed by: SURGERY

## 2025-01-23 RX ORDER — OXYCODONE AND ACETAMINOPHEN 5; 325 MG/1; MG/1
1 TABLET ORAL EVERY 6 HOURS PRN
Qty: 8 TABLET | Refills: 0 | Status: SHIPPED | OUTPATIENT
Start: 2025-01-23 | End: 2025-01-28

## 2025-01-23 RX ORDER — FENTANYL CITRATE 50 UG/ML
INJECTION, SOLUTION INTRAMUSCULAR; INTRAVENOUS AS NEEDED
Status: DISCONTINUED | OUTPATIENT
Start: 2025-01-23 | End: 2025-01-23

## 2025-01-23 RX ORDER — CEFAZOLIN SODIUM 2 G/100ML
2 INJECTION, SOLUTION INTRAVENOUS ONCE
Status: COMPLETED | OUTPATIENT
Start: 2025-01-23 | End: 2025-01-23

## 2025-01-23 RX ORDER — SODIUM CHLORIDE 0.9 G/100ML
INJECTION, SOLUTION IRRIGATION AS NEEDED
Status: DISCONTINUED | OUTPATIENT
Start: 2025-01-23 | End: 2025-01-23 | Stop reason: HOSPADM

## 2025-01-23 RX ORDER — OXYCODONE AND ACETAMINOPHEN 5; 325 MG/1; MG/1
1 TABLET ORAL ONCE
Status: COMPLETED | OUTPATIENT
Start: 2025-01-23 | End: 2025-01-23

## 2025-01-23 RX ORDER — PROPOFOL 10 MG/ML
INJECTION, EMULSION INTRAVENOUS AS NEEDED
Status: DISCONTINUED | OUTPATIENT
Start: 2025-01-23 | End: 2025-01-23

## 2025-01-23 RX ORDER — LIDOCAINE HYDROCHLORIDE 20 MG/ML
INJECTION, SOLUTION INFILTRATION; PERINEURAL AS NEEDED
Status: DISCONTINUED | OUTPATIENT
Start: 2025-01-23 | End: 2025-01-23

## 2025-01-23 RX ORDER — MIDAZOLAM HYDROCHLORIDE 1 MG/ML
INJECTION, SOLUTION INTRAMUSCULAR; INTRAVENOUS AS NEEDED
Status: DISCONTINUED | OUTPATIENT
Start: 2025-01-23 | End: 2025-01-23

## 2025-01-23 RX ADMIN — PROPOFOL 100 MCG/KG/MIN: 10 INJECTION, EMULSION INTRAVENOUS at 11:21

## 2025-01-23 RX ADMIN — OXYCODONE HYDROCHLORIDE AND ACETAMINOPHEN 1 TABLET: 5; 325 TABLET ORAL at 13:09

## 2025-01-23 RX ADMIN — SODIUM CHLORIDE, SODIUM LACTATE, POTASSIUM CHLORIDE, AND CALCIUM CHLORIDE: .6; .31; .03; .02 INJECTION, SOLUTION INTRAVENOUS at 11:16

## 2025-01-23 RX ADMIN — PROPOFOL 100 MG: 10 INJECTION, EMULSION INTRAVENOUS at 11:20

## 2025-01-23 RX ADMIN — FENTANYL CITRATE 50 MCG: 50 INJECTION, SOLUTION INTRAMUSCULAR; INTRAVENOUS at 11:43

## 2025-01-23 RX ADMIN — LIDOCAINE HYDROCHLORIDE 4 ML: 20 INJECTION, SOLUTION INFILTRATION; PERINEURAL at 11:20

## 2025-01-23 RX ADMIN — FENTANYL CITRATE 50 MCG: 50 INJECTION, SOLUTION INTRAMUSCULAR; INTRAVENOUS at 11:20

## 2025-01-23 RX ADMIN — MIDAZOLAM 2 MG: 1 INJECTION INTRAMUSCULAR; INTRAVENOUS at 11:15

## 2025-01-23 RX ADMIN — PROPOFOL 50 MG: 10 INJECTION, EMULSION INTRAVENOUS at 11:43

## 2025-01-23 RX ADMIN — CEFAZOLIN SODIUM 2 G: 2 INJECTION, SOLUTION INTRAVENOUS at 11:28

## 2025-01-23 SDOH — HEALTH STABILITY: MENTAL HEALTH: CURRENT SMOKER: 0

## 2025-01-23 ASSESSMENT — ENCOUNTER SYMPTOMS
NEUROLOGICAL NEGATIVE: 1
RESPIRATORY NEGATIVE: 1
GASTROINTESTINAL NEGATIVE: 1
EYES NEGATIVE: 1
PSYCHIATRIC NEGATIVE: 1
MUSCULOSKELETAL NEGATIVE: 1
CARDIOVASCULAR NEGATIVE: 1
HEMATOLOGIC/LYMPHATIC NEGATIVE: 1
ALLERGIC/IMMUNOLOGIC NEGATIVE: 1
CONSTITUTIONAL NEGATIVE: 1
ENDOCRINE NEGATIVE: 1

## 2025-01-23 ASSESSMENT — PAIN - FUNCTIONAL ASSESSMENT
PAIN_FUNCTIONAL_ASSESSMENT: 0-10

## 2025-01-23 ASSESSMENT — COLUMBIA-SUICIDE SEVERITY RATING SCALE - C-SSRS
6. HAVE YOU EVER DONE ANYTHING, STARTED TO DO ANYTHING, OR PREPARED TO DO ANYTHING TO END YOUR LIFE?: NO
2. HAVE YOU ACTUALLY HAD ANY THOUGHTS OF KILLING YOURSELF?: NO
1. IN THE PAST MONTH, HAVE YOU WISHED YOU WERE DEAD OR WISHED YOU COULD GO TO SLEEP AND NOT WAKE UP?: NO

## 2025-01-23 ASSESSMENT — PAIN SCALES - GENERAL
PAINLEVEL_OUTOF10: 0 - NO PAIN
PAINLEVEL_OUTOF10: 5 - MODERATE PAIN
PAINLEVEL_OUTOF10: 3
PAINLEVEL_OUTOF10: 0 - NO PAIN
PAIN_LEVEL: 0
PAINLEVEL_OUTOF10: 8
PAINLEVEL_OUTOF10: 8

## 2025-01-23 ASSESSMENT — PAIN DESCRIPTION - DESCRIPTORS: DESCRIPTORS: HEAVINESS;SORE

## 2025-01-23 NOTE — ANESTHESIA PREPROCEDURE EVALUATION
Patient: Nicole Nunes    Procedure Information       Date/Time: 01/23/25 1114    Procedure: EXCISION, LESION, BREAST (Left) - NO PAT PER DR SALAZAR. HAO    Location: [facility] / [facility]    Surgeons: Ruth Salazar MD            Relevant Problems   Neuro   (+) Anxiety   (+) Bipolar disorder   (+) Panic attacks      Hematology   (+) Anemia in pregnancy ([facility])      Musculoskeletal   (+) Cervical spondylosis without myelopathy      ID   (+) Gardnerella infection       Clinical information reviewed:   Tobacco  Allergies  Meds  Problems  Med Hx  Surg Hx   Fam Hx  Soc   Hx        NPO Detail:  NPO/Void Status  Date of Last Liquid: 01/22/25  Time of Last Liquid: 2200  Date of Last Solid: 01/22/25  Time of Last Solid: 2200  Last Intake Type: Light meal; Clear fluids  Time of Last Void: 1000         Physical Exam    Airway  Mallampati: II  TM distance: >3 FB  Neck ROM: full     Cardiovascular - normal exam     Dental    Pulmonary - normal exam     Abdominal - normal exam             Anesthesia Plan    History of general anesthesia?: yes  History of complications of general anesthesia?: no    ASA 2     MAC     The patient is not a current smoker.    intravenous induction   Anesthetic plan and risks discussed with patient.    Plan discussed with attending and CRNA.

## 2025-01-23 NOTE — OP NOTE
EXCISION, LESION, BREAST (L) Operative Note     Date: 2025  OR Location: [facility]    Name: Nicole Nunes, : 1990, Age: 34 y.o., MRN: [id number], Sex: female    Diagnosis  Pre-op Diagnosis      * Fibroadenoma of right breast [D24.1] Post-op Diagnosis     * Fibroadenoma of right breast [D24.1]     Procedures  EXCISION, LESION, BREAST  04598 - RI EXC CYST/ABERRANT BREAST TISSUE OPEN 1/> LESION      Surgeons      * Ruth Salazar - Primary    Resident/Fellow/Other Assistant:  Surgeons and Role:     * Alban Stinson PA-C - Assisting    Staff:   Circulator: Andreina  Scrub Person: Glenda Flowersub Person: Xi Mullen Circulator: Crystal  Relief Scrub: Kenyatta    Anesthesia Staff: Anesthesiologist: Kori Granados MD  CRNA: HAROLDO Gardner-CRNA  SRNA: Jona Napier RN    Procedure Summary  Anesthesia: Monitor Anesthesia Care  ASA: II  Estimated Blood Loss: 0mL  Intra-op Medications:   Administrations occurring from 1114 to 1234 on 25:   Medication Name Total Dose   sodium chloride 0.9 % irrigation solution 1,000 mL   ceFAZolin (Ancef) 2 g in dextrose (iso)  mL 2 g   dexmedeTOMIDine (Precedex) bolus from bag 10 mcg   fentaNYL PF 0.05 mg/mL 100 mcg   LR bolus Cannot be calculated   lidocaine (Xylocaine) 2 % 4 mL   midazolam (Versed) 1 mg/1 mL 2 mg   propofol (Diprivan) injection 10 mg/mL 394.8 mg              Anesthesia Record               Intraprocedure I/O Totals          Intake    Dexmedetomidine 0.00 mL    The total shown is the total volume documented since Anesthesia Start was filed.    LR bolus 500.00 mL    Total Intake 500 mL          Specimen:   ID Type Source Tests Collected by Time   1 : LEFT BREAST MASS Tissue BREAST LUMPECTOMY LEFT SURGICAL PATHOLOGY EXAM Ruth Salazar MD 2025 1141                 Drains and/or Catheters: * None in log *    Tourniquet Times:         Implants:     Findings: See report    Indications: Nicole Nunes is an 34 y.o. female who is having  surgery for fibroadenoma of left breast    The patient was seen in the preoperative area. The risks, benefits, complications, treatment options, non-operative alternatives, expected recovery and outcomes were discussed with the patient. The possibilities of reaction to medication, pulmonary aspiration, injury to surrounding structures, bleeding, recurrent infection, the need for additional procedures, failure to diagnose a condition, and creating a complication requiring transfusion or operation were discussed with the patient. The patient concurred with the proposed plan, giving informed consent.  The site of surgery was properly noted/marked if necessary per policy. The patient has been actively warmed in preoperative area. Preoperative antibiotics given.  Venous thrombosis prophylaxis provided.  After satisfactory induction of anesthesia, patient was prepped and draped.  Local anesthesia was infiltrated overlying the palpable mass as marked with the patient in holding.  Curvilinear incision was made along the lower outer areolar border and dissection carried down into the immediate subcutaneous space with cautery.  Palpable mass, rubbery and fibrotic, consistent with fibroadenoma was not circumferentially excised with cautery.  Hemostasis assured with focal cautery.  Specimen was oriented for pathology with short stitch superior, long stitch lateral, 1 long 1 short suture deep and passed off the field.  Palpation revealed no other concerning findings.  Patient's submuscular implant was well away from the area of dissection.  Copious irrigation undertaken.  Incision was then reapproximated in layers with 3-0 Vicryl suture deeply and 4 Monocryl.  Dressings applied.  Patient awakened from anesthesia and transferred to recovery in satisfactory condition, tolerated well.    Procedure Details:.  Complications:  None; patient tolerated the procedure well.    Disposition: PACU - hemodynamically stable.  Condition: stable                  Additional Details: Surgical assistant required for retraction and exposure    Attending Attestation:     Ruth Salazar  Phone Number: [phone]

## 2025-01-23 NOTE — NURSING NOTE
VSS, no nausea, pain treated with PRN medication in Phase II Recovery. Patient tolerated PO intake of clear liquids and cookies. Upon review of discharge instructions no further questions remained by patient or family.

## 2025-01-23 NOTE — DISCHARGE INSTRUCTIONS
Activity as tolerated  Steri-Strips x 1 week  Okay to shower  Okay to drive once off pain medicine  Moderate Sedation in Adults Discharge Instructions    About this topic  Moderate sedation is also known as conscious sedation. It changes your state of being awake or consciousness. With this sedation, you may feel slight pain or pressure during a procedure. The drugs help you to relax and may even allow you to sleep. It will be easy to wake you and you may talk and answer questions while under sedation. Most likely, you will not remember what happens while under this sedation.  What care is needed at home?  Ask your doctor what you need to do when you go home. Make sure you understand everything the doctor says. This way you will know what you need to do.  You will not be allowed to drive right away after the procedure. Ask a family member or a friend to drive you home.  Do not operate heavy or dangerous machinery for at least 24 hours.  Do not make major decisions or sign important papers for at least 24 hours. You may not be thinking clearly.  Avoid beer, wine, or mixed drinks (alcohol) for at least 24 hours.  You are at a higher risk of falling for at least 24 hours after moderate sedation.  Take extra care when you get up.  Do not change positions quickly.  Do not rush when you need to go to the bathroom or to answer the phone.  Ask for help if you feel unsteady when you try to walk.  Wear shoes with non-slip soles and low heels.  What follow-up care is needed?  Your doctor may ask you to make visits to the office to check on your progress. Be sure to keep these visits. Your doctor may also refer you to other doctors or tell you that you need more tests or care.  What drugs may be needed?  The doctor may order drugs to:  Help with pain  Treat an upset stomach or throwing up  Will physical activity be limited?  Rest for the day of the procedure. Avoid strenuous activities like heavy lifting and hard exercise. Talk  to your doctor about whether you need to limit lifting or exercise after your procedure.  What changes to diet are needed?  Start with a light diet when you are fully awake. This includes things that are easy to swallow like soups, pudding, jello, toast, and eggs. Slowly progress to your normal diet.  What problems could happen?  Low blood pressure  Breathing problems  Upset stomach or throwing up  Dizziness  When do I need to call the doctor?  Feel dizzy, weak, or tired  Faint  Very bad headache  Upset stomach or throwing up  To follow up for more tests or care  Teach Back: Helping You Understand  The Teach Back Method helps you understand the information we are giving you. After you talk with the staff, tell them in your own words what you learned. This helps to make sure the staff has described each thing clearly. It also helps to explain things that may have been confusing. Before going home, make sure you can do these:  I can tell you about my procedure.  I can tell you if I need more tests or care.  I can tell you what is good for me to eat and drink the next day.  I can tell you what I would do if I feel dizzy, weak, or tired.  Last Reviewed Date  2020-03-02

## 2025-01-23 NOTE — H&P
History Of Present Illness  Nicole Nunes is a 34 y.o. female presenting with left breast mass.     Past Medical History  Past Medical History:   Diagnosis Date   • Allergic    • Anxiety        • Breast mass 2024   • Depression    • Joint pain     Right know   • Migraine, unspecified, not intractable, without status migrainosus 2017    Migraines   • Other specified anxiety disorders 2017    Depression with anxiety   • Personal history of other mental and behavioral disorders     History of bipolar disorder       Surgical History  Past Surgical History:   Procedure Laterality Date   • ANTERIOR CRUCIATE LIGAMENT REPAIR Right 2022   • APPENDECTOMY     • BREAST SURGERY      augmentation   •  SECTION, LOW TRANSVERSE     • OVARIAN CYST SURGERY  2020    Ovarian cystectomy        Social History  She reports that she has never smoked. She has never used smokeless tobacco. She reports that she does not drink alcohol and does not use drugs.    Family History  Family History   Problem Relation Name Age of Onset   • Breast cancer Father's Sister Juliet    • Esophageal cancer Maternal Grandmother Sri    • Ovarian cancer Maternal Grandmother Sri    • Stomach cancer Maternal Grandmother Sri    • Cancer Maternal Grandmother Sri    • Other (paternal great aunt breast cancer) Other          Allergies  Naproxen    Review of Systems   Constitutional: Negative.    HENT: Negative.     Eyes: Negative.    Respiratory: Negative.     Cardiovascular: Negative.    Gastrointestinal: Negative.    Endocrine: Negative.    Genitourinary: Negative.    Musculoskeletal: Negative.    Skin: Negative.    Allergic/Immunologic: Negative.    Neurological: Negative.    Hematological: Negative.    Psychiatric/Behavioral: Negative.          Physical Exam  Constitutional:       General: She is not in acute distress.     Appearance: Normal appearance.   HENT:      Head: Normocephalic and atraumatic.       "Mouth/Throat:      Mouth: Mucous membranes are moist.   Eyes:      Pupils: Pupils are equal, round, and reactive to light.   Cardiovascular:      Rate and Rhythm: Normal rate and regular rhythm.   Pulmonary:      Effort: Pulmonary effort is normal.   Chest:      Chest wall: No mass, swelling or tenderness.   Breasts:     Right: Normal. No mass, nipple discharge, skin change or tenderness.      Left: Mass present. No nipple discharge, skin change or tenderness.       Abdominal:      General: Abdomen is flat.      Palpations: Abdomen is soft.   Musculoskeletal:         General: Normal range of motion.      Cervical back: Normal range of motion.   Lymphadenopathy:      Cervical: No cervical adenopathy.      Upper Body:      Right upper body: No axillary adenopathy.      Left upper body: No axillary adenopathy.   Skin:     General: Skin is warm and dry.   Neurological:      Mental Status: She is alert. Mental status is at baseline.   Psychiatric:         Mood and Affect: Mood normal.        Last Recorded Vitals  Blood pressure 118/77, pulse 99, temperature 35.9 °C (96.6 °F), temperature source Temporal, resp. rate 16, height 1.651 m (5' 5\"), weight 72 kg (158 lb 11.7 oz), SpO2 98%.    Relevant Results             Assessment/Plan   Assessment & Plan  Fibroadenoma of right breast (Resolved: 1/23/2025)    Left breast mass      Excisional biopsy left breast       I spent  minutes in the professional and overall care of this patient.      Ruth Salazar MD    "

## 2025-01-24 ENCOUNTER — OFFICE VISIT (OUTPATIENT)
Dept: SURGERY | Facility: HOSPITAL | Age: 35
End: 2025-01-24
Payer: COMMERCIAL

## 2025-01-24 DIAGNOSIS — Z80.3 FAMILY HISTORY OF BREAST CANCER: Primary | ICD-10-CM

## 2025-01-24 DIAGNOSIS — D24.1 FIBROADENOMA OF RIGHT BREAST: ICD-10-CM

## 2025-01-24 PROCEDURE — 99211 OFF/OP EST MAY X REQ PHY/QHP: CPT | Performed by: SURGERY

## 2025-01-24 RX ORDER — ONDANSETRON 4 MG/1
4 TABLET, ORALLY DISINTEGRATING ORAL EVERY 8 HOURS PRN
Qty: 10 TABLET | Refills: 0 | Status: SHIPPED | OUTPATIENT
Start: 2025-01-24 | End: 2025-01-31

## 2025-01-24 NOTE — PROGRESS NOTES
Subjective   Patient ID: Nicole Nunes is a 34 y.o. female who presents for Follow-up (S/p surgery yesterday).  HPI  Incision check. Transient bleeding, now with fullness, surrounding erythema.  No significant tenderness.  Did experience an episode of significant nausea and vomiting after arriving home yesterday following anesthesia.    Review of Systems      Past Medical History:   Diagnosis Date    Allergic     Anxiety     2015    Breast mass 2024    Depression     Joint pain     Right know    Migraine, unspecified, not intractable, without status migrainosus 2017    Migraines    Other specified anxiety disorders 2017    Depression with anxiety    Personal history of other mental and behavioral disorders     History of bipolar disorder     Past Surgical History:   Procedure Laterality Date    ANTERIOR CRUCIATE LIGAMENT REPAIR Right 2022    APPENDECTOMY      BREAST SURGERY      augmentation     SECTION, LOW TRANSVERSE      OVARIAN CYST SURGERY  2020    Ovarian cystectomy     Family History   Problem Relation Name Age of Onset    Breast cancer Father's Sister Juliet     Esophageal cancer Maternal Grandmother Sri     Ovarian cancer Maternal Grandmother Sri     Stomach cancer Maternal Grandmother Sri     Cancer Maternal Grandmother Sri     Other (paternal great aunt breast cancer) Other        Social History     Socioeconomic History    Marital status:    Tobacco Use    Smoking status: Never    Smokeless tobacco: Never   Vaping Use    Vaping status: Never Used   Substance and Sexual Activity    Alcohol use: Never    Drug use: Never    Sexual activity: Defer     Comment: LMP: 1/2          Current Outpatient Medications:     cyclobenzaprine (Flexeril) 10 mg tablet, Take 1 tablet (10 mg) by mouth as needed at bedtime for muscle spasms for up to 14 days. (Patient not taking: Reported on 1/10/2025), Disp: 14 tablet, Rfl: 0    flibanserin 100 mg tablet, Take 1 tablet by  mouth once daily., Disp: 30 tablet, Rfl: 11    hydrOXYzine HCL (Atarax) 25 mg tablet, Take 1 tab 1 hour prior to dental procedure, Disp: 1 tablet, Rfl: 0    methylPREDNISolone (Medrol Dospak) 4 mg tablets, Follow schedule on package instructions (Patient not taking: Reported on 1/10/2025), Disp: 1 each, Rfl: 0    oxyCODONE-acetaminophen (Percocet) 5-325 mg tablet, Take 1 tablet by mouth every 6 hours if needed for severe pain (7 - 10) (G89.1) for up to 5 days., Disp: 8 tablet, Rfl: 0    SUMAtriptan (Imitrex) 50 mg tablet, Take 1 tablet (50 mg) by mouth 1 time if needed for migraine. May repeat after 2 hours. (Patient not taking: Reported on 1/10/2025), Disp: 27 tablet, Rfl: 1     Objective   There were no vitals filed for this visit.   Physical Exam  No acute distress.  Some fullness at the site with intact incision.  Surrounding faint erythema and inferior ecchymosis    Assessment/Plan       Reassurance provided, appears more consistent with postoperative hematoma/seroma formation and reactive change.  Not excessive.  Continue compression with sports bra and ice packs.  Continue to monitor closely.   Ruth Salazar MD

## 2025-01-25 ENCOUNTER — APPOINTMENT (OUTPATIENT)
Dept: PRIMARY CARE | Facility: CLINIC | Age: 35
End: 2025-01-25
Payer: COMMERCIAL

## 2025-01-28 LAB
LABORATORY COMMENT REPORT: NORMAL
PATH REPORT.FINAL DX SPEC: NORMAL
PATH REPORT.GROSS SPEC: NORMAL
PATH REPORT.RELEVANT HX SPEC: NORMAL
PATH REPORT.TOTAL CANCER: NORMAL

## 2025-01-29 ENCOUNTER — APPOINTMENT (OUTPATIENT)
Dept: PRIMARY CARE | Facility: CLINIC | Age: 35
End: 2025-01-29
Payer: COMMERCIAL

## 2025-01-29 VITALS
BODY MASS INDEX: 26.66 KG/M2 | HEART RATE: 64 BPM | TEMPERATURE: 97.8 F | WEIGHT: 160 LBS | SYSTOLIC BLOOD PRESSURE: 102 MMHG | DIASTOLIC BLOOD PRESSURE: 66 MMHG | HEIGHT: 65 IN

## 2025-01-29 DIAGNOSIS — Z00.00 ANNUAL PHYSICAL EXAM: Primary | ICD-10-CM

## 2025-01-29 DIAGNOSIS — G43.E19 INTRACTABLE CHRONIC MIGRAINE WITH AURA AND WITHOUT STATUS MIGRAINOSUS: ICD-10-CM

## 2025-01-29 PROCEDURE — 3008F BODY MASS INDEX DOCD: CPT | Performed by: INTERNAL MEDICINE

## 2025-01-29 PROCEDURE — 99395 PREV VISIT EST AGE 18-39: CPT | Performed by: INTERNAL MEDICINE

## 2025-01-29 PROCEDURE — 1036F TOBACCO NON-USER: CPT | Performed by: INTERNAL MEDICINE

## 2025-01-29 RX ORDER — ELETRIPTAN HYDROBROMIDE 20 MG/1
20 TABLET, FILM COATED ORAL ONCE AS NEEDED
Qty: 9 TABLET | Refills: 3 | Status: SHIPPED | OUTPATIENT
Start: 2025-01-29 | End: 2026-01-29

## 2025-01-29 RX ORDER — HYDROCHLOROTHIAZIDE 25 MG/1
120 TABLET ORAL NIGHTLY
Qty: 30 TABLET | Refills: 3 | Status: SHIPPED | OUTPATIENT
Start: 2025-01-29 | End: 2026-01-29

## 2025-01-29 ASSESSMENT — ENCOUNTER SYMPTOMS
RESPIRATORY NEGATIVE: 1
CARDIOVASCULAR NEGATIVE: 1
CONSTITUTIONAL NEGATIVE: 1
HEADACHES: 1

## 2025-01-29 ASSESSMENT — PATIENT HEALTH QUESTIONNAIRE - PHQ9
SUM OF ALL RESPONSES TO PHQ9 QUESTIONS 1 & 2: 0
2. FEELING DOWN, DEPRESSED OR HOPELESS: NOT AT ALL
1. LITTLE INTEREST OR PLEASURE IN DOING THINGS: NOT AT ALL

## 2025-01-29 NOTE — PROGRESS NOTES
"Subjective   Patient ID: Nicole Nunes is a 34 y.o. female who presents for Annual Exam (Pt here for yearly physical and medication refill).    HPI patient here for annual physical.  She was taking sumatriptan which according to her is not helping she gets migraines about 15 to 16 days a month she also has medication overuse headache due to excess use of analgesics i.e. acetaminophen and nonsteroidals etc.    Review of Systems   Constitutional: Negative.    HENT: Negative.     Respiratory: Negative.     Cardiovascular: Negative.    Neurological:  Positive for headaches.   All other systems reviewed and are negative.      Objective   /66   Pulse 64   Temp 36.6 °C (97.8 °F) (Temporal)   Ht 1.651 m (5' 5\")   Wt 72.6 kg (160 lb)   BMI 26.63 kg/m²     Physical Exam  Vitals reviewed.   Constitutional:       Appearance: Normal appearance.   HENT:      Head: Normocephalic and atraumatic.   Cardiovascular:      Rate and Rhythm: Normal rate and regular rhythm.      Pulses: Normal pulses.      Heart sounds: Normal heart sounds.   Pulmonary:      Effort: Pulmonary effort is normal.      Breath sounds: Normal breath sounds.   Musculoskeletal:      Right lower leg: No edema.      Left lower leg: No edema.   Neurological:      General: No focal deficit present.      Mental Status: She is alert and oriented to person, place, and time.         Assessment/Plan   Problem List Items Addressed This Visit             ICD-10-CM    Migraine G43.909    Relevant Medications    verapamil SR (Calan-SR) 120 mg ER tablet    eletriptan (Relpax) 20 mg tablet    Annual physical exam - Primary Z00.00   Think she gets headaches almost every third day and last for 3 days she will be put on maintenance verapamil and also will change triptan to eletriptan and discontinue Imitrex.  She was advised to maintain headache journal.  Future option for her can be trial of CGRP agents and may need referral to neurology       "

## 2025-02-03 DIAGNOSIS — Z83.1 FAMILY HISTORY OF COLD SORES: ICD-10-CM

## 2025-02-03 RX ORDER — VALACYCLOVIR HYDROCHLORIDE 1 G/1
1000 TABLET, FILM COATED ORAL DAILY
Qty: 30 TABLET | Refills: 0 | Status: SHIPPED | OUTPATIENT
Start: 2025-02-03 | End: 2025-03-05

## 2025-02-05 ENCOUNTER — OFFICE VISIT (OUTPATIENT)
Dept: SURGERY | Facility: HOSPITAL | Age: 35
End: 2025-02-05
Payer: COMMERCIAL

## 2025-02-05 VITALS — BODY MASS INDEX: 26.66 KG/M2 | WEIGHT: 160 LBS | HEIGHT: 65 IN

## 2025-02-05 DIAGNOSIS — D24.1 FIBROADENOMA OF RIGHT BREAST: Primary | ICD-10-CM

## 2025-02-05 PROCEDURE — 1036F TOBACCO NON-USER: CPT | Performed by: SURGERY

## 2025-02-05 PROCEDURE — 99211 OFF/OP EST MAY X REQ PHY/QHP: CPT | Performed by: SURGERY

## 2025-02-05 PROCEDURE — 3008F BODY MASS INDEX DOCD: CPT | Performed by: SURGERY

## 2025-02-05 NOTE — PROGRESS NOTES
Subjective   Patient ID: Nicole Nunes is a 34 y.o. female who presents for Post-op.  HPI  Postoperative visit following left breast fibroadenoma excision, pathology confirmed.  No complaints.  Incision is well-healed without concerns for cellulitis or wound healing.  No pain.    Review of Systems      Past Medical History:   Diagnosis Date    Allergic     Anxiety     2015    Breast mass 2024    Depression     Joint pain     Right know    Migraine, unspecified, not intractable, without status migrainosus 2017    Migraines    Other specified anxiety disorders 2017    Depression with anxiety    Personal history of other mental and behavioral disorders     History of bipolar disorder     Past Surgical History:   Procedure Laterality Date    ANTERIOR CRUCIATE LIGAMENT REPAIR Right 2022    APPENDECTOMY      BREAST SURGERY      augmentation     SECTION, LOW TRANSVERSE      OVARIAN CYST SURGERY  2020    Ovarian cystectomy     Family History   Problem Relation Name Age of Onset    Breast cancer Father's Sister Juliet     Esophageal cancer Maternal Grandmother Sri     Ovarian cancer Maternal Grandmother Sri     Stomach cancer Maternal Grandmother Sri     Cancer Maternal Grandmother Sri     Other (paternal great aunt breast cancer) Other        Social History     Socioeconomic History    Marital status:    Tobacco Use    Smoking status: Never    Smokeless tobacco: Never   Vaping Use    Vaping status: Never Used   Substance and Sexual Activity    Alcohol use: Never    Drug use: Never    Sexual activity: Defer     Comment: LMP: 1/2          Current Outpatient Medications:     eletriptan (Relpax) 20 mg tablet, Take 1 tablet (20 mg) by mouth 1 time if needed for migraine. May repeat once after 2 hours., Disp: 9 tablet, Rfl: 3    valACYclovir (Valtrex) 1 gram tablet, Take 1 tablet (1,000 mg) by mouth once daily., Disp: 30 tablet, Rfl: 0    verapamil SR (Calan-SR) 120 mg ER  tablet, Take 1 tablet (120 mg) by mouth once daily at bedtime. Do not crush or chew., Disp: 30 tablet, Rfl: 3    cyclobenzaprine (Flexeril) 10 mg tablet, Take 1 tablet (10 mg) by mouth as needed at bedtime for muscle spasms for up to 14 days., Disp: 14 tablet, Rfl: 0    methylPREDNISolone (Medrol Dospak) 4 mg tablets, Follow schedule on package instructions, Disp: 1 each, Rfl: 0    SUMAtriptan (Imitrex) 50 mg tablet, Take 1 tablet (50 mg) by mouth 1 time if needed for migraine. May repeat after 2 hours., Disp: 27 tablet, Rfl: 1     Objective   There were no vitals filed for this visit.   Physical Exam  No acute distress.  Resolved ecchymosis/reactive erythema    Assessment/Plan   Pathology reviewed, activity as tolerated, follow-up as needed.  Ruth Salazar MD

## 2025-03-10 ENCOUNTER — APPOINTMENT (OUTPATIENT)
Dept: PRIMARY CARE | Facility: CLINIC | Age: 35
End: 2025-03-10
Payer: COMMERCIAL

## 2025-03-10 DIAGNOSIS — G43.E19 INTRACTABLE CHRONIC MIGRAINE WITH AURA AND WITHOUT STATUS MIGRAINOSUS: Primary | ICD-10-CM

## 2025-03-10 PROCEDURE — 99213 OFFICE O/P EST LOW 20 MIN: CPT | Performed by: INTERNAL MEDICINE

## 2025-03-10 RX ORDER — ELETRIPTAN HYDROBROMIDE 20 MG/1
20 TABLET, FILM COATED ORAL ONCE AS NEEDED
Qty: 18 TABLET | Refills: 3 | Status: SHIPPED | OUTPATIENT
Start: 2025-03-10 | End: 2026-03-10

## 2025-03-10 RX ORDER — TOPIRAMATE 25 MG/1
25 TABLET ORAL 2 TIMES DAILY
Qty: 120 TABLET | Refills: 2 | Status: SHIPPED | OUTPATIENT
Start: 2025-03-10 | End: 2025-09-06

## 2025-03-10 ASSESSMENT — ENCOUNTER SYMPTOMS
VERTIGO: 1
NAUSEA: 1
VOMITING: 0
BOWEL INCONTINENCE: 0
MEMORY LOSS: 0
NEUROLOGIC COMPLAINT: 1
FOCAL WEAKNESS: 0
NECK PAIN: 1
FOCAL SENSORY LOSS: 0
BACK PAIN: 0
FEVER: 0
SHORTNESS OF BREATH: 0
ALTERED MENTAL STATUS: 0
SLURRED SPEECH: 0
DIAPHORESIS: 0
WEAKNESS: 0
DIZZINESS: 1
ABDOMINAL PAIN: 0
CONFUSION: 0
PALPITATIONS: 0
HEADACHES: 1
LOSS OF BALANCE: 0
AURA: 1
VISUAL CHANGE: 1
CLUMSINESS: 0
NEAR-SYNCOPE: 0

## 2025-03-10 NOTE — PROGRESS NOTES
Subjective   Patient ID: Nicole Nunes is a 34 y.o. female who presents for No chief complaint on file..  Migraine headaches intractable    Acute Neurological Problem  The patient's primary symptoms include a visual change. The patient's pertinent negatives include no altered mental status, clumsiness, focal sensory loss, focal weakness, loss of balance, memory loss, near-syncope, slurred speech, syncope or weakness. This is a chronic problem. The current episode started more than 1 year ago. The neurological problem developed suddenly. The problem has been rapidly worsening since onset. There was facial focality noted. Associated symptoms include an aura, dizziness, headaches, nausea, neck pain and vertigo. Pertinent negatives include no abdominal pain, auditory change, back pain, bladder incontinence, bowel incontinence, chest pain, confusion, diaphoresis, fever, palpitations, shortness of breath or vomiting. Past treatments include acetaminophen, bed rest, drinking, eating, medication, position change, sleep and sugar/glucose. The treatment provided mild relief.      Patient having a lot of migraines she initially responded well to the triptan and verapamil but now she gets headaches almost every other day  Review of Systems   Constitutional:  Negative for diaphoresis and fever.   Respiratory:  Negative for shortness of breath.    Cardiovascular:  Negative for chest pain, palpitations and near-syncope.   Gastrointestinal:  Positive for nausea. Negative for abdominal pain, bowel incontinence and vomiting.   Genitourinary:  Negative for bladder incontinence.   Musculoskeletal:  Positive for neck pain. Negative for back pain.   Neurological:  Positive for dizziness, vertigo and headaches. Negative for focal weakness, syncope, weakness and loss of balance.   Psychiatric/Behavioral:  Negative for confusion and memory loss.      As mentioned above history of migraines and she has maintained a headache journal.   She never had a head scanning to rule out structural abnormalities so we will do that.  All other 12 review of system negative except for what is mentioned in HPI    Objective   There were no vitals taken for this visit.    Physical Exam  Constitutional:       Appearance: Normal appearance.   Neurological:      Mental Status: She is alert and oriented to person, place, and time.      Cranial Nerves: No cranial nerve deficit.   Psychiatric:         Mood and Affect: Mood normal.         Behavior: Behavior normal.         Assessment/Plan   Problem List Items Addressed This Visit             ICD-10-CM    Migraine - Primary G43.909    Relevant Medications    topiramate (Topamax) 25 mg tablet    eletriptan (Relpax) 20 mg tablet    Other Relevant Orders    MR brain wo IV contrast     MRI brain will be done to rule out aneurysm or any brain tumor/neoplasm.  Patient was put on topiramate will titrate her dose upwards every week and we have changed her triptan to Relpax.  Future options can be CRG P analogs for example Nurtec every other day or Ubrelvy or Emgality injections..  Follow-up with us as scheduled in May.

## 2025-03-13 ENCOUNTER — TELEMEDICINE CLINICAL SUPPORT (OUTPATIENT)
Dept: GENETICS | Facility: CLINIC | Age: 35
End: 2025-03-13
Payer: COMMERCIAL

## 2025-03-13 DIAGNOSIS — Z80.3 FAMILY HISTORY OF BREAST CANCER: ICD-10-CM

## 2025-03-13 DIAGNOSIS — Z13.71 ENCOUNTER FOR NONPROCREATIVE GENETIC COUNSELING AND TESTING: Primary | ICD-10-CM

## 2025-03-13 DIAGNOSIS — Z80.41 FAMILY HISTORY OF OVARIAN CANCER: ICD-10-CM

## 2025-03-13 DIAGNOSIS — Z71.83 ENCOUNTER FOR NONPROCREATIVE GENETIC COUNSELING AND TESTING: Primary | ICD-10-CM

## 2025-03-13 PROCEDURE — 96041 GENETIC COUNSELING SVC EA 30: CPT | Performed by: GENETIC COUNSELOR, MS

## 2025-03-13 NOTE — PROGRESS NOTES
History of Present Illness:  Nicole Nunes is a 34 y.o. female with a family history of breast cancer. She was referred to the Cancer Genetics Clinic at [facility] by Ruth Salazar MD. Ms. Nunes is interested in genetic testing to clarify their personal risk for cancer, as well as the risks to their family members.    Cancer Medical History:  Personal history of cancer? No.    Prior hereditary cancer (germline) genetic testing? No.  Prior hereditary cancer (germline) genetic testing in family member? Unsure.    Reports history of bilateral ovarian cystectomies at age 28. Reports benign pathology.     Cancer screening history:  Mammograms? Yes, 2024.  Breast MRI? No.  Breast biopsy? No, but had left lumpectomy 25. Pathology showed fibroadenoma, pseudoangiomatous stromal hyperplasia (PASH), microcysts, usual ductal hyperplasia.  PAP smear? Yes, last 2024. Has per gyn provider. No known history of abnormal Pap smears.  Colonoscopy? No.   Upper endoscopy? No.  Dermatology? No.  Other cancer screening? No.    Reproductive History:  Number of children: 1.  Number of pregnancies: 1.  Age first birth: 33.  Breast feeding? No.  Menarche (age): 15.  Menopause (age): N/A.  OCP: Yes, for ~15 years in total. Has IUD now.  HRT: N/A.    Hysterectomy? No.  Oophorectomy? No.    Family history:  A 4-generation pedigree was obtained and was significant for the following:    -Patient, no history of cancer;  -Mother, no history of cancer, alive at age 56;  -Maternal grandmother, ovarian cancer in her 40s, then esophageal & stomach cancer , at age 60;  -Maternal grandfather, prostate cancer,  at age 86;  -Father, no history of cancer, alive at age 60;  -Paternal aunt #1, breast cancer at 45, living;  -Paternal aunt #2, breast cancer at age 45, living;  -No other known family history of cancer.    Maternal ancestry is English.  Paternal ancestry is English. There is no known Ashkenazi Holiness ancestry or  consanguinity.    Genetic counseling:  Nicole haas a 34 y.o. female with a family history of ovarian cancer in her maternal grandmother and early-onset breast cancer in two paternal aunts. Both histories are concerning for possible hereditary cancer. This could be BRCA1 or BRCA2-related hereditary breast and ovarian cancer (HBOC), or hereditary breast cancer due to a different gene mutation, such as in PALB2.  Nicole meets current national (NCCN) criteria for testing of high-penetrance breast cancer susceptibility genes, including BRCA1, BRCA2, CDH1, PALB2, PTEN, and TP53.  Testing is medically necessary, as it will help determine if Nicole is a candidate for ongoing high-risk breast care & risk-reducing BSO (having the ovaries and fallopian tubes removed to prevent ovarian cancer).    We reviewed genes and chromosomes, inherited forms of breast and ovarian cancer, and the BRCA1 and BRCA2 genes causing HBOC.  We discussed that most cancers are not due to an inherited genetic susceptibility.  However, in about 5-10% of families, there is an inherited genetic mutation that can make a person more susceptible to developing certain forms of cancer.  Within these families, we often see multiple family members with cancer, occurring in multiple generations.  In addition, earlier onset and bilateral cancers are suggestive of an inherited form of cancer.  Finally, there is a clustering of certain types of cancer in these families, such as breast and ovarian cancer..    We discussed the BRCA1 and BRCA2 genes, which are two genes that have been linked to early-onset breast and/or ovarian cancer.  Mutations in these genes are inherited in a dominant pattern and confer up to an 87% lifetime risk for breast cancer.  This is elevated compared to the general population risk of 10-12%.  In addition, BRCA1 and BRCA2 mutation carriers have up to a 45% lifetime risk for ovarian cancer, which is elevated over the 2% general population risk.   Mutation carriers who have already been diagnosed with cancer have an increased risk to develop a second, contralateral breast cancer.  BRCA2 gene mutation carriers have an increased risk for male breast cancer, prostate cancer, melanoma, gastric cancer, and pancreatic cancer.    We discussed that there are multiple genes associated with increased breast and/or ovarian cancer risk. Some genes, like the BRCA genes are considered highly penetrant breast and ovarian cancer genes, meaning a mutation in the gene confers a high risk of breast and/or ovarian cancer. On the other hand, there are other intermediate (moderate risk) breast and ovarian cancer genes. For many of the moderate risk genes, there is sometimes limited information regarding the degree to which a mutation in the gene affects risk of different types of cancers. Additionally, for some of these moderate risk genes, the appropriate management for individuals who have a mutation in one of these genes is not always clear. In many cases, even if an individual tests positive for a mutation in a moderate risk gene, recommendations are still based on the family history, not the positive test result.    Nicole was counseled about hereditary cancer susceptibility including cancer risks, options for increased screening and/or risk reduction, genetic testing (including positive, negative, and uncertain results), and the implications for other family members.  We discussed performing testing for high-penetrance breast cancer susceptibility genes, ideally as part of a multi-gene panel.  We discussed smaller and larger hereditary cancer panels. Nicole ultimately expressed the greatest interest in the [facility] CancerNext panel, which examines the following 39 genes:  APC, LIZZY, AXIN2, BAP1, BARD1, BMPR1A, BRCA1, BRCA2, BRIP1, CDH1, CDKN2A, CHEK2, EPCAM, FH, FLCN, GREM1, HOXB13, MBD4, MET, MLH1, MSH2, MSH3, MSH6, MUTYH, NF1, NTHL1, PALB2, PMS2, POLD1, POLE, PTEN, RAD51C,  RAD51D, SMAD4, STK11, TP53, TSC1, TSC2, VHL.    We discussed the Genetic Information Nondiscrimination Act (NAHOMI). We discussed the protections and limitations of NAHOMI. NAHOMI generally makes in illegal for health insurance companies, group health plans, and most employers (with >15 employees) to discriminate based on genetic information. It does not protect against genetic discrimination for life insurance, disability insurance, long-term care, or other insurances.    After a discussion about the risks, benefits, and limitations of genetic testing, Nicole elected to undergo genetic testing for hereditary cancer using the [facility] panel described above. Oral consent for testing was obtained. An [employer] DNA/RNA blood test kit will be sent to Nicole's home. They will then bring the test kit to a  outpatient location to have their blood drawn for testing (using the test tubes provided in the kit). The sample will then be sent to [facility] for analysis.    Results are typically available in 3 weeks from the time of blood draw, however as Nicole requested a bill hold on her account (so that [facility] will discuss expected costs with her prior to proceeding with testing), test results may be delayed. Nicole will return to the Cancer Genetics Clinic to discuss their testing results.  At that time, we will make recommendations for both Nicole and their family members in terms of cancer screening and/or cancer risk reduction options.    PLAN:  1. Nicole elected to undergo genetic testing for hereditary cancer using the [facility] panel described above. Oral consent for testing was obtained. An [employer] DNA/RNA blood test kit will be sent to Nicole's home. They will then bring the test kit to a  outpatient location to have their blood drawn for testing (using the test tubes provided in the kit). The sample will then be sent to [facility] for analysis.    2.  Nicole will return to the Cancer Genetics Clinic to discuss their test results.  A  follow up appointment has been scheduled for 4/21.    3. We remain available to Nicole at [phone] if any questions arise regarding information discussed at today's visit. Ruth can be reached directly at [phone].    Ruth Guerin MS, [facility]  Genetic Counselor  [facility]  [phone]    Total time spent on day of encounter: 65 minutes (45 minutes with patient, 20 minutes on pre/post patient care activities, including documentation).     Virtual or Telephone Consent    An interactive audio and video telecommunication system which permits real time communications between the patient (at the originating site) and provider (at the distant site) was utilized to provide this telehealth service.   Verbal consent was requested and obtained from Nicole Nunes on this date, 03/13/25 for a telehealth visit and the patient's location was confirmed at the time of the visit.

## 2025-03-19 ENCOUNTER — LAB (OUTPATIENT)
Dept: LAB | Facility: HOSPITAL | Age: 35
End: 2025-03-19
Payer: COMMERCIAL

## 2025-03-25 ENCOUNTER — TELEPHONE (OUTPATIENT)
Dept: GENETICS | Facility: CLINIC | Age: 35
End: 2025-03-25
Payer: COMMERCIAL

## 2025-03-25 ENCOUNTER — HOSPITAL ENCOUNTER (OUTPATIENT)
Dept: RADIOLOGY | Facility: HOSPITAL | Age: 35
Discharge: HOME | End: 2025-03-25
Payer: COMMERCIAL

## 2025-03-25 DIAGNOSIS — G43.E19 INTRACTABLE CHRONIC MIGRAINE WITH AURA AND WITHOUT STATUS MIGRAINOSUS: ICD-10-CM

## 2025-03-25 PROCEDURE — 70551 MRI BRAIN STEM W/O DYE: CPT

## 2025-03-25 NOTE — TELEPHONE ENCOUNTER
Called patient regarding her genetic testing ordered through [facility] by TREVON Mchugh. Explained that her order was under a billing hold. She confirmed she was in contact with [facility] billing and was waiting for them to get back to her with the estimated OOP cost. If she does not hear in the next few days, she will call [facility] again.

## 2025-04-03 ENCOUNTER — OFFICE VISIT (OUTPATIENT)
Dept: ORTHOPEDIC SURGERY | Facility: CLINIC | Age: 35
End: 2025-04-03
Payer: COMMERCIAL

## 2025-04-03 DIAGNOSIS — G56.01 CARPAL TUNNEL SYNDROME OF RIGHT WRIST: Primary | ICD-10-CM

## 2025-04-03 PROCEDURE — L3908 WHO COCK-UP NONMOLDE PRE OTS: HCPCS | Performed by: FAMILY MEDICINE

## 2025-04-03 PROCEDURE — 99214 OFFICE O/P EST MOD 30 MIN: CPT | Performed by: FAMILY MEDICINE

## 2025-04-03 PROCEDURE — 1036F TOBACCO NON-USER: CPT | Performed by: FAMILY MEDICINE

## 2025-04-03 RX ORDER — PREDNISONE 50 MG/1
50 TABLET ORAL DAILY
Qty: 5 TABLET | Refills: 0 | Status: SHIPPED | OUTPATIENT
Start: 2025-04-03 | End: 2025-04-08

## 2025-04-03 NOTE — PROGRESS NOTES
Acute Injury New Patient Visit    CC:   Chief Complaint   Patient presents with    Right Wrist - Pain       HPI: Nicole is a 34 y.o.female who presents today with new complaints of worsening severe upper extremity numbness and tingling.  Patient with a history of borderline carpal tunnel syndrome diagnosed back in 2021 presents here today for further evaluation.  She has had 4 days of persistent worsening pain entire numbness tingling throughout the hand.  She has to ice her wrist while typing for work she has a child at home and is often lifting and playing with the kids.  She denies any obvious known injury or trauma.  States most of the discomfort is over the thumb and the index and long finger but is noted some more recent discomfort to the small fingers.        Review of Systems   GENERAL: Negative for malaise, significant weight loss, fever  MUSCULOSKELETAL: See HPI  NEURO: Positive for numbness / tingling     Past Medical History  Past Medical History:   Diagnosis Date    Allergic     Anxiety     2015    Breast mass June 2024    Depression     Joint pain     Right know    Migraine, unspecified, not intractable, without status migrainosus 09/13/2017    Migraines    Other specified anxiety disorders 09/13/2017    Depression with anxiety    Personal history of other mental and behavioral disorders     History of bipolar disorder       Medication review  Medication Documentation Review Audit       Reviewed by Kayla Rodríguez MA (Medical Assistant) on 04/03/25 at 0805      Medication Order Taking? Sig Documenting Provider Last Dose Status   eletriptan (Relpax) 20 mg tablet [id number]  Take 1 tablet (20 mg) by mouth 1 time if needed for migraine. May repeat once after 2 hours. Harleen Oviedo MD  Active   eletriptan (Relpax) 20 mg tablet [id number]  Take 1 tablet (20 mg) by mouth 1 time if needed for migraine. May repeat once after 2 hours. Harleen Oviedo MD  Active   topiramate (Topamax) 25 mg  tablet [id number]  Take 1 tablet (25 mg) by mouth 2 times a day. 1 po bid x 2 weeks then 2 po bid Harleen Oviedo MD  Active   verapamil SR (Calan-SR) 120 mg ER tablet [id number]  Take 1 tablet (120 mg) by mouth once daily at bedtime. Do not crush or chew. Harleen Oviedo MD  Active                    Allergies  Allergies   Allergen Reactions    Naproxen Other     Stomach pain       Social History  Social History     Socioeconomic History    Marital status:      Spouse name: Not on file    Number of children: Not on file    Years of education: Not on file    Highest education level: Not on file   Occupational History    Not on file   Tobacco Use    Smoking status: Never    Smokeless tobacco: Never   Vaping Use    Vaping status: Never Used   Substance and Sexual Activity    Alcohol use: Never    Drug use: Never    Sexual activity: Defer     Comment: LMP: /   Other Topics Concern    Not on file   Social History Narrative    Not on file     Social Drivers of Health     Financial Resource Strain: Not on file   Food Insecurity: Not on file   Transportation Needs: Not on file   Physical Activity: Not on file   Stress: Not on file   Social Connections: Not on file   Intimate Partner Violence: Not on file   Housing Stability: Not on file       Surgical History  Past Surgical History:   Procedure Laterality Date    ANTERIOR CRUCIATE LIGAMENT REPAIR Right 2022    APPENDECTOMY      BREAST SURGERY      augmentation     SECTION, LOW TRANSVERSE      OVARIAN CYST SURGERY  2020    Ovarian cystectomy       Physical Exam:  GENERAL:  Patient is awake, alert, and oriented to person place and time.  Patient appears well nourished and well kept.  Affect Calm, Not Acutely Distressed.  HEENT:  Normocephalic, Atraumatic, EOMI  CARDIOVASCULAR:  Hemodynamically stable.  RESPIRATORY:  Normal respirations with unlabored breathing.  NEURO: Gross sensation intact to the upper extremities  bilaterally.  Extremity: Right hand and wrist exam demonstrates skin which is warm pink well-perfused tenderness palpation circumferentially about the wrist with immediately positive Tinel's and Phalen's.   strength is intact but obviously weaker than the contralateral limb.  Good distal cap refill noted throughout the limb.  Forearm compartments are soft and compressible and decreased wrist flexion wrist extension secondary to pain.  Negative Finkelstein test.      Diagnostics: No x-rays today        Procedure: None  Procedures    Assessment:   Problem List Items Addressed This Visit    None  Visit Diagnoses       Carpal tunnel syndrome of right wrist    -  Primary    Relevant Medications    predniSONE (Deltasone) 50 mg tablet    Other Relevant Orders    EMG & nerve conduction    Wrist brace             Plan: Offered the patient a carpal tunnel injection today which she kindly deferred.  She was interested in trying oral steroid and a carpal tunnel splint which she has used in the past but not recently.  She states that her previous nerve conduction study back in 2021 demonstrated borderline carpal tunnel syndrome.  Will obtain updated nerve conduction EMG study and will have her follow-up with one of our hand specialist going forward.  She should call or return sooner with any worsening issues or concerns.  Orders Placed This Encounter    Wrist brace    EMG & nerve conduction    predniSONE (Deltasone) 50 mg tablet      At the conclusion of the visit there were no further questions by the patient/family regarding their plan of care.  Patient was instructed to call or return with any issues, questions, or concerns regarding their injury and/or treatment plan described above.     04/03/25 at 8:24 AM - Cole C Budinsky, MD    Office: [phone]    This note was prepared using voice recognition software.  The details of this note are correct and have been reviewed, and corrected to the best of my ability.  Some  grammatical errors may persist related to the Dragon software.

## 2025-04-04 LAB
COMMENTS - MP RESULT TYPE: NORMAL
SCAN RESULT: NORMAL

## 2025-04-17 ENCOUNTER — OUTSIDE SERVICES (OUTPATIENT)
Dept: NEUROLOGY | Age: 35
End: 2025-04-17
Payer: COMMERCIAL

## 2025-04-17 ENCOUNTER — HOSPITAL ENCOUNTER (OUTPATIENT)
Dept: NEUROLOGY | Facility: HOSPITAL | Age: 35
Discharge: HOME | End: 2025-04-17
Payer: COMMERCIAL

## 2025-04-17 DIAGNOSIS — G56.01 CARPAL TUNNEL SYNDROME OF RIGHT WRIST: ICD-10-CM

## 2025-04-17 DIAGNOSIS — G56.03 BILATERAL CARPAL TUNNEL SYNDROME: Primary | ICD-10-CM

## 2025-04-17 PROCEDURE — 95913 NRV CNDJ TEST 13/> STUDIES: CPT | Performed by: PSYCHIATRY & NEUROLOGY

## 2025-04-17 PROCEDURE — 95886 MUSC TEST DONE W/N TEST COMP: CPT

## 2025-04-17 PROCEDURE — 95886 MUSC TEST DONE W/N TEST COMP: CPT | Performed by: PSYCHIATRY & NEUROLOGY

## 2025-04-21 ENCOUNTER — TELEMEDICINE CLINICAL SUPPORT (OUTPATIENT)
Facility: CLINIC | Age: 35
End: 2025-04-21
Payer: COMMERCIAL

## 2025-04-21 DIAGNOSIS — Z71.83 ENCOUNTER FOR NONPROCREATIVE GENETIC COUNSELING AND TESTING: Primary | ICD-10-CM

## 2025-04-21 DIAGNOSIS — Z13.71 BRCA NEGATIVE: ICD-10-CM

## 2025-04-21 DIAGNOSIS — Z13.71 ENCOUNTER FOR NONPROCREATIVE GENETIC COUNSELING AND TESTING: Primary | ICD-10-CM

## 2025-04-21 DIAGNOSIS — Z80.3 FAMILY HISTORY OF MALIGNANT NEOPLASM OF BREAST: ICD-10-CM

## 2025-04-21 DIAGNOSIS — Z15.89 CHEK2 POSITIVE: ICD-10-CM

## 2025-04-21 DIAGNOSIS — Z91.89 INCREASED RISK OF BREAST CANCER: ICD-10-CM

## 2025-04-21 PROCEDURE — 96041 GENETIC COUNSELING SVC EA 30: CPT | Performed by: GENETIC COUNSELOR, MS

## 2025-04-21 NOTE — PROGRESS NOTES
History of Present Illness:  Nicole Nunes is a 34 y.o. female see in virtual follow-up in the [facility]. She was last seen 3/13/2025, at which time she chose to pursue testing for hereditary cancer due to her family history of cancer. 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 Her family history should be used to guide her care in this situation, as this CHEK2 variant has reduced cancer risks compared to other CHEK2 variants. The remainder of her genetic test results were negative (normal). She has access to her results via her  [employer].    Family history (as previouslly noted with patient's breast history added):     -Patient, no history of cancer;   -S/p left lumpectomy 25. Pathology showed fibroadenoma, pseudoangiomatous stromal hyperplasia (PASH), microcysts, usual ductal hyperplasia. PRN follow-up with surgeon recommended;  -Mother, no history of cancer, alive at age 56;  -Maternal grandmother, ovarian cancer in her 40s, then esophageal & stomach cancer , at age 60;  -Maternal grandfather, prostate cancer,  at age 86;  -Father, no history of cancer, alive at age 60;  -Paternal aunt #1, breast cancer at 45, living;  -Paternal aunt #2, breast cancer at age 45, living;  -No other known family history of cancer.     Maternal ancestry is English.  Paternal ancestry is English. There is no known Ashkenazi Hinduism ancestry or consanguinity.    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 Risk estimate: Current risk estimates associated with this variant are not well defined; however, evidence suggests that this variant  may confer some degree of increased risk of breast cancer lower than that of typical CHEK2 pathogenic alleles. See below for additional information.    The expression and severity of disease for this individual cannot be predicted. Genetic testing for pathogenic mutations in family members can be helpful in identifying at-risk individuals. Genetic counseling is a recommended option for all individuals undergoing genetic testing. No additional pathogenic mutations, variants of unknown significance, or gross deletions or duplications were detected.    Genes Analyzed (39 total): APC, LIZZY, BAP1, BARD1, BMPR1A, BRCA1, BRCA2, BRIP1, CDH1, CDKN2A, CHEK2, FH, FLCN, MET, MLH1, MSH2, MSH6, MUTYH, NF1, NTHL1, PALB2, PMS2, PTEN, RAD51C, RAD51D, SMAD4, STK11, TP53, TSC1, TSC2 and VHL (sequencing and deletion/duplication); AXIN2, HOXB13, MBD4, MSH3, POLD1 and POLE (sequencing only); EPCAM and GREM1 (deletion/duplication only). RNA data is routinely analyzed for use in variant interpretation for all genes.    CHEK2 Additional Information  The p.I157T variant (also known as c.470T>C) is located in coding exon 3 of the CHEK2 gene. This alteration results from a T to C substitution at nucleotide position 470. The isoleucine at codon 157 is replaced by threonine, an amino acid with similar properties. Case-controls studies have reported an increased risk of breast cancer for individuals carrying this variant with odds ratios ranging from 1.21 to 1.6 (Brittany C et al. Cancer Res. 2004 Apr;64:2677-9; Staples B et al. Lancet Oncol. 2011 May;12:477-88; Dg GAMEZ et al.  Pac. J. Cancer Prev. 2012;13:1355-60; Lul et al. N Engl J Med. 2021 02;384:428-439; Erik C et al. N Engl J Med. 2021 02;384(5):440-451; Kathy CHURCH et al. BLADIMIR Oncol. 2022; Sep). Other studies have reported a possible association with increased risk for colorectal, prostate, and thyroid cancers (Dg GAMEZ et al.  Pac. J. Cancer Prev. 2012;13:2051-5; Mckay POZO et al. DNA  Cell Biol. 2013 Jun;32:329-35; Madhavi COHEN et al. Hered Cancer Clin Pract. 2015 Mar;13:8; Sio?ek M et al. Int. J. Cancer. 2015 Aug;137:548-52). This alteration is located in the FHA protein domain and functional studies have reported discordant findings. While some studies have reported the variant as functional (Mendel X et al. J. Biol. Chem. 2001 Jan;276:2971-4; Phoebe MARI et al. Mol. Cancer Res. 2003 Jun;1:598-609; Joshua CARABALLO et al. Cancer Res. 2001 Nov;61:8062-7; Joshua HAQ and Alfredo JH. J. Biol. Chem. 2001 Aug;276:80382-24; Олег BRYAN et al. J. Biol. Chem. 2002 May;277:20356-55; Maximino O et al. Int. J. Cancer. 2004 Sep;111:543-7; Stone Z et al. Mol. Cell. 2009 Sep;35:818-2; Nadege YEUNG. Hum Mutat. 2019 05;40(5):631-648; Bosimone RACM et al. Cancer Res. 2022 02;82(4):615-631), others indicate impaired activity for this variant (Roni ALATORRE et al. Nature. 2001 Apr;410:842-7; Roni ALATORRE et al. Oncogene. 2001 Sep;20:5503-10; Denisa J et al. Mol. Cell. 2002 May;9:1045-54; Grant MEANS et al. Hum. Mol. Hannah. 2012 Jun;21:2738-44). This variant was also functional in an in vitro kinase assay but had intermediate activity in a human cell-based kinase assay also measuring KAP1 phosphorylation (Adam P et al. Klin Onkol. 2019;32:36-50). Incomplete segregation of p.I157T with disease has been reported in familial breast cancer kindreds and has also been observed at high frequencies in general population databases, consistent with previous observations of intermediate cancer risk and reduced penetrance (Grant MEANS et al. Hum. Mol. Hannah. 2012 Jun;21:2738-44). This amino acid position is well conserved in available vertebrate species. In addition, the in silico prediction for this alteration is inconclusive. Based on the majority of available evidence to date, this variant is interpreted as a moderate risk mutation, also referred to as an established risk allele.    Genetic counseling:  Nicole was found to be carrying CHEK2 moderate risk mutation known  as p.I157T, or simply I157T. 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    Classification of the I157T CHEK2 mutation:  We discussed that this particular CHEK2 mutation (c.470T>C (p.Afn590Prj), aka I157T) is recognized by most laboratories as a pathogenic or likely pathogenic mutation, meaning it is a disease-causing mutation and leads to increased cancer risk. We reviewed that this mutation appears to be of low penetrance and/or moderate risk, meaning that the cancer risks are nowhere near as high as some other mutations in the same gene. Most individuals with this mutation will not develop cancer due to the gene mutation itself.    Some testing laboratories currently classify this particular CHEK2 mutation as a variant of uncertain significance (VUS) due to conflicting evidence regarding the pathogenicity of this specific CHEK2 variant. This lack of consensus between testing laboratories is based on both conflicting biochemical analyses of CHEK2 function as well as conflicting risk-association studies. Based on current guidelines, the National Comprehensive Cancer Network (NCCN) and American College of Medical Genetics and Genomics (ACMG) generally recommend that cancer screening for individuals with this particular CHEK2 mutation be based on the best estimate of cancer risk estimates for this particular gene mutation, in addition to family history.    Cancer risks associated with the I157T CHEK2 mutation:  Among the case-control studies showing evidence of moderate association with cancer risk, women with a heterozygous (single copy) CHEK2 I157T mutation were estimated to be about 1.5-fold increased risk for breast cancer over the general population, which translates into an approximately 18-19% lifetime risk. CHEK2 mutations in general have also been shown to increase the risk for a second primary breast cancer; however, there is presently no evidence that  the CHEK2 I157T mutation specifically is associated with a significantly increased risk for additional primary breast malignancies.    Studies have also suggested that both men and women with the CHEK2 I157T mutation may be at increased risk for colorectal cancer, however current national (NCCN) guidelines do not recommend colon cancer screening beyond general population screening and that based on family history.    Other cancers have also been associated with CHEK2 mutations (including prostate cancer), but there is limited data. Specific risk estimates for the CHEK2 I157T mutation have not been determined.     Current NCCN recommendations for care for I157T CHEK2 mutation carriers:  Current NCCN recommendations for women who are CHEK2 I157T mutation carriers include age-related screening (yearly mammograms from age 40). Any change to breast cancer screening from population screening should be based on family history alone.    The NCCN does not recommend any increased screening for colorectal cancer based on the finding of the CHEK2 I157T mutation alone. Any change from general population colorectal cancer screening is to be based on a personal or first-degree family history of colorectal cancer or polyps, per relevant guidelines.    Other cancers have also been associated with CHEK2 mutations, including prostate, thyroid, and male breast cancer. For this particular CHEK2 I157T mutation, we do not have any current screening recommendations for the other cancers at this time, beyond what is recommended for the general population.    As recommendations for CHEK2 mutation carriers will change over time, we asked that Nicole follow-up with Genetics every 1-2 years so the current screening recommendations for CHEK2 I157T mutation carriers can be reviewed.    Recommendations for Nicole:  Per the above, at this time, we discussed any changes to Nicole's care should be based on her personal and/or family history alone. The  JENA/Omi-Nicolasa model was used to estimate Nicole's lifetime risk for breast cancer. This estimate was 20.1% (versus general population risk of 11.1%). Nicole is therefore a candidate for high-risk breast care from age 35 (10 years earlier than when her paternal aunts were diagnosed with breast cancer). Nicole was encouraged to either follow-up with Dr. Salazar, or with another  High Risk Breast Care program provider. A referral was made to the  High Risk Breast Care program today. Nicole was asked to let me know if she has any difficulty in scheduling follow-up.     Nicole was also encouraged to follow with her primary care provider(s) for any other age-related cancer screenings.    Genetic testing & care for Nicole's family members:  Nicole's first-degree relatives, which include her parents and son, each have a 50% chance of having the same CHEK2 I157T mutation she carries. Given the current understanding of the cancer risks associated with this variant, testing other family members solely for the benefit of determining if they have this CHEK2 I157T mutation may be of limited medical benefit to other family members, since the screening recommendations are family history based. For Nicole's close female relatives on her paternal side, they are still considered to be at increased risk for breast cancer based on the family history of breast cancer alone, and may wish to discuss screening options based on family history. Regarding her son, he is a minor (about 2 years old), and we will likely have a better understanding of this variant and its associated cancer risks when he is older,    Other paternal relatives may benefit from genetic counseling and possible testing, given the two paternal aunts' history of early-onset breast cancer. If [place] relatives are local, we would be glad to see them here at . They can call [phone], option 1, to schedule an appointment. If they live outside the [facility], her  relatives can find a genetics specialist in their area by visiting the National Society for Genetic Counselors website at: <http://www.nsgc.org/> under “Find a Genetic Counselor,” with “Cancer” specified under special area.    Ruth Guerin MS, [facility] [place]  [facility]  [phone] (direct)  [phone], option 1 (main)    Total time spent on day of encounter: 30 minutes (24 minutes with patient, 6 minutes on pre/post patient care activities, including documentation).     Virtual or Telephone Consent    An interactive audio and video telecommunication system which permits real time communications between the patient (at the originating site) and provider (at the distant site) was utilized to provide this telehealth service.   Verbal consent was requested and obtained from Nicole Nunes on this date, 04/21/25 for a telehealth visit and the patient's location was confirmed at the time of the visit.

## 2025-04-22 PROBLEM — Z15.89 CHEK2 POSITIVE: Status: ACTIVE | Noted: 2025-04-22

## 2025-04-24 ENCOUNTER — APPOINTMENT (OUTPATIENT)
Dept: ORTHOPEDIC SURGERY | Facility: CLINIC | Age: 35
End: 2025-04-24
Payer: COMMERCIAL

## 2025-05-07 DIAGNOSIS — Z13.1 SCREENING FOR DIABETES MELLITUS: ICD-10-CM

## 2025-05-07 DIAGNOSIS — R53.83 FATIGUE, UNSPECIFIED TYPE: ICD-10-CM

## 2025-05-08 ENCOUNTER — APPOINTMENT (OUTPATIENT)
Dept: ORTHOPEDIC SURGERY | Facility: CLINIC | Age: 35
End: 2025-05-08
Payer: COMMERCIAL

## 2025-05-08 DIAGNOSIS — G56.01 CARPAL TUNNEL SYNDROME OF RIGHT WRIST: Primary | ICD-10-CM

## 2025-05-08 NOTE — PROGRESS NOTES
History of Present Illness:  Presents with  right hand numbness. The symptoms have been present for months. The patient denies any inciting trauma. The numbness primarily involves the thumb, index finger, and long finger. There is minimal numbness in the small finger. The patient complains of intermittant, moderate hand pain which is worse at night. It causes frequent night awakenings. The patient presents due to worsening symptoms. They have tried nighttime bracing without improvement in sxs.         Review of Systems   GENERAL: Negative for malaise, significant weight loss, fever  MUSCULOSKELETAL: see HPI  NEURO:  Negative    The patient's past medical history, family history, social history, and review of systems were reviewed. History is otherwise negative except as stated in the HPI.    Physical Examination:  General: Alert and oriented to person, place, and time.  No acute distress and breathing comfortably: Pleasant and cooperative with examination.  HEENT: Head is normocephalic and atraumatic.  Neck: Supple, no visible swelling.  Cardiovascular: No palpable tachycardia  Lungs: No audible wheezing or labored breathing  Abdomen: Nondistended.  On musculoskeletal examination, the patient has full elbow range of motion. There is no tenderness to palpation about the lateral epicondyle. Tinels at the cubital tunnel and elbow flexion/compression are negative for ulnar nerve symptoms. In regards to the wrist, there is no obvious deformity. Range of motion is full in flexion, extension, pronation, and supination. Strength is 5/5 in flexion and extension. There is no tenderness to palpation about the 1st dorsal compartment, the 1st CMC joint, or the TFCC. Tinels at the carpal tunnel and Durkins compression test are positive. The patient has subjective paresthesias in the median nerve distribution. Sensation and motor function are intact in the radial, and ulnar nerve distribution. There is no obvious thenar atrophy,  and thenar strength is 5/5. There is no intrinsic atrophy, and intrinsic strength is 5/5. All fingers are without triggering and are without pain over the A1 pulley. The patient can make a full composite fist. The hand itself is warm and well perfused. The skin is intact throughout. The contralateral hand/wrist are normal to inspection, range of motion, stability, and strength.    Imaging:  EMG mild R CTS    Assessment:  Patient with right carpal tunnel syndrome.     Plan:  Right CTR Surgery. Based on the history and physical exam, I believe that the patient has carpal tunnel syndrome. Given the duration/severity of symptoms and the failure of non-operative treatment, the patient wants to consider carpal tunnel release.  The benefit of surgery would be to stop the progression of symptoms, with the hope that the symptoms would also resolve. The risks of surgery include, but are not limited to, infection, bleeding, nerve/vessel injury, pillar pain, continued symptoms, and anesthetic complications. The patient understands the risks/benefits and consented to the surgery.  I have answered all questions regarding the surgery itself and the post-operative course.      Kimberly Dailey MD  Orthopaedic Surgeon

## 2025-05-16 LAB
EST. AVERAGE GLUCOSE BLD GHB EST-MCNC: 108 MG/DL
EST. AVERAGE GLUCOSE BLD GHB EST-SCNC: 6 MMOL/L
HBA1C MFR BLD: 5.4 %
TSH SERPL-ACNC: 1.37 MIU/L

## 2025-05-28 ENCOUNTER — APPOINTMENT (OUTPATIENT)
Dept: PRIMARY CARE | Facility: CLINIC | Age: 35
End: 2025-05-28
Payer: COMMERCIAL

## 2025-05-28 VITALS
WEIGHT: 155 LBS | SYSTOLIC BLOOD PRESSURE: 104 MMHG | HEIGHT: 65 IN | DIASTOLIC BLOOD PRESSURE: 62 MMHG | BODY MASS INDEX: 25.83 KG/M2 | HEART RATE: 64 BPM | TEMPERATURE: 97.6 F

## 2025-05-28 DIAGNOSIS — G43.E19 INTRACTABLE CHRONIC MIGRAINE WITH AURA AND WITHOUT STATUS MIGRAINOSUS: Primary | ICD-10-CM

## 2025-05-28 DIAGNOSIS — Z15.89 CHEK2 POSITIVE: ICD-10-CM

## 2025-05-28 PROCEDURE — 99213 OFFICE O/P EST LOW 20 MIN: CPT | Performed by: INTERNAL MEDICINE

## 2025-05-28 PROCEDURE — 3008F BODY MASS INDEX DOCD: CPT | Performed by: INTERNAL MEDICINE

## 2025-05-28 PROCEDURE — 1036F TOBACCO NON-USER: CPT | Performed by: INTERNAL MEDICINE

## 2025-05-28 RX ORDER — LEVONORGESTREL 52 MG/1
1 INTRAUTERINE DEVICE INTRAUTERINE ONCE
COMMUNITY

## 2025-05-28 RX ORDER — TOPIRAMATE 100 MG/1
100 TABLET, FILM COATED ORAL 2 TIMES DAILY
Qty: 180 TABLET | Refills: 1 | Status: SHIPPED | OUTPATIENT
Start: 2025-05-28 | End: 2025-11-24

## 2025-05-28 ASSESSMENT — ENCOUNTER SYMPTOMS
CONSTITUTIONAL NEGATIVE: 1
ALLERGIC/IMMUNOLOGIC NEGATIVE: 1
HEADACHES: 1
ENDOCRINE NEGATIVE: 1
CARDIOVASCULAR NEGATIVE: 1
RESPIRATORY NEGATIVE: 1
GASTROINTESTINAL NEGATIVE: 1
EYES NEGATIVE: 1
HEMATOLOGIC/LYMPHATIC NEGATIVE: 1

## 2025-05-28 ASSESSMENT — PATIENT HEALTH QUESTIONNAIRE - PHQ9
SUM OF ALL RESPONSES TO PHQ9 QUESTIONS 1 & 2: 0
1. LITTLE INTEREST OR PLEASURE IN DOING THINGS: NOT AT ALL
2. FEELING DOWN, DEPRESSED OR HOPELESS: NOT AT ALL

## 2025-05-28 NOTE — PROGRESS NOTES
"Subjective   Patient ID: Nicole Nunes is a 34 y.o. female who presents for Follow-up (Pt here for office visit lab results ).    HPI   Headaches are much better on topiramate.  She gets breakthrough headache couple times in a week as opposed to 4 days  Review of Systems   Constitutional: Negative.    Eyes: Negative.    Respiratory: Negative.     Cardiovascular: Negative.    Gastrointestinal: Negative.    Endocrine: Negative.    Genitourinary: Negative.    Allergic/Immunologic: Negative.    Neurological:  Positive for headaches.   Hematological: Negative.    All other systems reviewed and are negative.      Objective   /62   Pulse 64   Temp 36.4 °C (97.6 °F) (Temporal)   Ht 1.651 m (5' 5\")   Wt 70.3 kg (155 lb)   BMI 25.79 kg/m²     Physical Exam  Vitals reviewed.   Constitutional:       Appearance: Normal appearance.   HENT:      Head: Normocephalic and atraumatic.   Cardiovascular:      Rate and Rhythm: Normal rate and regular rhythm.   Pulmonary:      Effort: Pulmonary effort is normal.      Breath sounds: Normal breath sounds.   Musculoskeletal:      Right lower leg: No edema.      Left lower leg: No edema.   Neurological:      General: No focal deficit present.      Mental Status: She is alert and oriented to person, place, and time. Mental status is at baseline.      Cranial Nerves: No cranial nerve deficit.      Motor: No weakness.         Assessment/Plan   Problem List Items Addressed This Visit           ICD-10-CM    Migraine - Primary G43.909    Relevant Medications    topiramate (Topamax) 100 mg tablet    CHEK2 positive Z15.89   Increase Topamax to 100 mg at night then 50 in the morning and may eventually work up to 100 twice daily use eletriptan as needed for breakthrough migraines follow-up with us in 4 months.  Slightly increased risk of renal stones also discussed with patient.  Genetic testing CHEK2 positive she was advised to see oncology and will get the MRIs and mammograms " alternate clinically with Chek 2 positive she has 23% lifetime risk of breast cancer.

## 2025-06-02 DIAGNOSIS — Z83.1 FAMILY HISTORY OF COLD SORES: ICD-10-CM

## 2025-06-02 RX ORDER — VALACYCLOVIR HYDROCHLORIDE 1 G/1
1000 TABLET, FILM COATED ORAL DAILY
Qty: 30 TABLET | Refills: 0 | Status: SHIPPED | OUTPATIENT
Start: 2025-06-02

## 2025-06-24 NOTE — PROGRESS NOTES
Nicole Nunes female   1990 34 y.o.   [id number]      Chief Complaint  High risk evaluation    History Of Present Illness  Nicole Nunes is a 34 y.o. female referred to the [facility] by Ruth Guerin for high risk evaluation. She has family history of breast cancer. She has a history of bilateral breast augmentation in with . She has a history of left breast excisional biopsy, benign fibroadenoma. She underwent genetic testing in 2025, 39 gene panel demonstrating CHEK2 gene mutation.     REPRODUCTIVE HISTORY: menarche age, , first birth age 32, did not breastfeed, OCP's, premenopausal, LMP, heterogeneously dense tissue    FAMILY CANCER HISTORY:   Paternal Aunt: Breast cancer  Paternal Great Aunt: Breast cancer  Maternal Grandmother: Ovarian cancer, Esophageal cancer, Stomach cancer     Surgical History  She has a past surgical history that includes Anterior cruciate ligament repair (Right, 2022); Ovarian cyst surgery (2020);  section, low transverse; Appendectomy; and Breast surgery.     Social History  She reports that she has never smoked. She has never used smokeless tobacco. She reports that she does not drink alcohol and does not use drugs.    Family History  Family History[1]     Allergies  Naproxen    Medications  Current Outpatient Medications   Medication Instructions    eletriptan (RELPAX) 20 mg, oral, Once as needed, May repeat once after 2 hours.    levonorgestrel (Mirena) 20 mcg/24hr IUD 1 each, Once    topiramate (TOPAMAX) 100 mg, oral, 2 times daily, 1 po bid x 2 weeks then 2 po bid    valACYclovir (VALTREX) 1,000 mg, oral, Daily         REVIEW OF SYSTEMS    Constitutional:  Negative for appetite change, fatigue, fever and unexpected weight change.   HENT:  Negative for ear pain, hearing loss, nosebleeds, sore throat and trouble swallowing.    Eyes:  Negative for discharge, itching and visual disturbance.   Respiratory:  Negative for cough, chest tightness  and shortness of breath.    Cardiovascular:  Negative for chest pain, palpitations and leg swelling.   Breast: as indicated in HPI  Gastrointestinal:  Negative for abdominal pain, constipation, diarrhea and nausea.   Endocrine: Negative for cold intolerance and heat intolerance.   Genitourinary:  Negative for dysuria, frequency, hematuria, pelvic pain and vaginal bleeding.   Musculoskeletal:  Negative for arthralgias, back pain, gait problem, joint swelling and myalgias.   Skin:  Negative for color change and rash.   Allergic/Immunologic: Negative for environmental allergies and food allergies.   Neurological:  Negative for dizziness, tremors, speech difficulty, weakness, numbness and headaches.   Hematological:  Does not bruise/bleed easily.   Psychiatric/Behavioral:  Negative for agitation, dysphoric mood and sleep disturbance. The patient is not nervous/anxious.         Past Medical History  She has a past medical history of Allergic, Anxiety, Breast mass (June 2024), Depression, Joint pain, Migraine, unspecified, not intractable, without status migrainosus (09/13/2017), Other specified anxiety disorders (09/13/2017), and Personal history of other mental and behavioral disorders.     Physical Exam  Patient is alert and oriented x3 and in a relaxed and appropriate mood. Her gait is steady and hand grasps are equal. Sclera is clear. The breasts are nearly symmetrical. The tissue is soft without palpable abnormalities, discrete nodules or masses. The skin and nipples appear normal. There is no cervical, supraclavicular or axillary lymphadenopathy. Heart rate and rhythm normal, S1 and S2 appreciated. The lungs are clear to auscultation bilaterally. Abdomen is soft and non-tender.       Physical Exam     Last Recorded Vitals  There were no vitals filed for this visit.    Risk Profile    Assessment/Plan   Normal clinical exam, high risk surveillance care, CHEK2 positive, history of left excisional biopsy, benign,  history of bilateral breast augmentation, family history of breast cancer, heterogeneously dense tissue    Plan:     Patient Discussion/Summary  Your clinical examination and imaging are normal. Please return in one year for bilateral screening mammogram and office visit or sooner if you have any problems or concerns.     You can see your health information, review clinical summaries from office visits & test results online when you follow your health with MY  Chart, a personal health record. To sign up go to [email or web address]. If you need assistance with signing up or trouble getting into your account call [employer] [facility] Patient Line 24/7 at [phone].    My office phone number is [phone] if you need to get in touch with me or have additional questions or concerns. Thank you for choosing [facility] and trusting me as your healthcare provider. I look forward to seeing you again at your next office visit. I am honored to be a provider on your health care team and I remain dedicated to helping you achieve your health goals.      Althea Anglin, APRN-CNP       [1]   Family History  Problem Relation Name Age of Onset    Breast cancer Father's Sister Juliet     Esophageal cancer Maternal Grandmother Sri     Ovarian cancer Maternal Grandmother Sri     Stomach cancer Maternal Grandmother Sri     Cancer Maternal Grandmother Sri     Other (paternal great aunt breast cancer) Other

## 2025-07-09 ENCOUNTER — APPOINTMENT (OUTPATIENT)
Dept: SURGICAL ONCOLOGY | Facility: HOSPITAL | Age: 35
End: 2025-07-09
Payer: COMMERCIAL

## 2025-07-14 DIAGNOSIS — G56.01 CARPAL TUNNEL SYNDROME OF RIGHT WRIST: Primary | ICD-10-CM

## 2025-07-14 RX ORDER — TRAMADOL HYDROCHLORIDE 50 MG/1
50 TABLET, FILM COATED ORAL EVERY 8 HOURS PRN
Qty: 5 TABLET | Refills: 0 | Status: SHIPPED | OUTPATIENT
Start: 2025-07-14

## 2025-07-14 RX ORDER — IBUPROFEN 800 MG/1
800 TABLET, FILM COATED ORAL 3 TIMES DAILY
Qty: 15 TABLET | Refills: 0 | Status: SHIPPED | OUTPATIENT
Start: 2025-07-14 | End: 2025-07-19

## 2025-07-15 PROCEDURE — 64721 CARPAL TUNNEL SURGERY: CPT | Performed by: STUDENT IN AN ORGANIZED HEALTH CARE EDUCATION/TRAINING PROGRAM

## 2025-07-31 ENCOUNTER — APPOINTMENT (OUTPATIENT)
Dept: ORTHOPEDIC SURGERY | Facility: CLINIC | Age: 35
End: 2025-07-31
Payer: COMMERCIAL

## 2025-07-31 DIAGNOSIS — G56.01 CARPAL TUNNEL SYNDROME OF RIGHT WRIST: Primary | ICD-10-CM

## 2025-07-31 PROBLEM — S83.90XA SPRAIN OF KNEE: Status: ACTIVE | Noted: 2022-07-20

## 2025-07-31 PROBLEM — R10.9 ACUTE ABDOMINAL PAIN: Status: RESOLVED | Noted: 2025-07-31 | Resolved: 2025-07-31

## 2025-07-31 PROBLEM — R21 RASH AND OTHER NONSPECIFIC SKIN ERUPTION: Status: RESOLVED | Noted: 2023-09-13 | Resolved: 2025-07-31

## 2025-07-31 PROBLEM — J06.9 ACUTE UPPER RESPIRATORY INFECTION: Status: RESOLVED | Noted: 2025-07-31 | Resolved: 2025-07-31

## 2025-07-31 PROBLEM — K59.03 DRUG-INDUCED CONSTIPATION: Status: RESOLVED | Noted: 2023-11-01 | Resolved: 2025-07-31

## 2025-07-31 PROBLEM — M25.569 KNEE PAIN: Status: RESOLVED | Noted: 2025-07-31 | Resolved: 2025-07-31

## 2025-07-31 PROBLEM — S93.402A SPRAIN OF LEFT ANKLE: Status: RESOLVED | Noted: 2023-11-01 | Resolved: 2025-07-31

## 2025-07-31 PROBLEM — R53.83 FATIGUE: Status: RESOLVED | Noted: 2023-11-01 | Resolved: 2025-07-31

## 2025-07-31 PROBLEM — R10.11 RUQ ABDOMINAL PAIN: Status: RESOLVED | Noted: 2025-07-31 | Resolved: 2025-07-31

## 2025-07-31 PROBLEM — R11.2 NAUSEA AND VOMITING: Status: RESOLVED | Noted: 2025-07-31 | Resolved: 2025-07-31

## 2025-07-31 PROBLEM — M25.579 ANKLE PAIN: Status: RESOLVED | Noted: 2022-07-20 | Resolved: 2025-07-31

## 2025-07-31 PROBLEM — Z86.59 HISTORY OF MANIC DEPRESSIVE DISORDER: Status: RESOLVED | Noted: 2025-07-31 | Resolved: 2025-07-31

## 2025-07-31 PROBLEM — N97.9 FEMALE INFERTILITY: Status: RESOLVED | Noted: 2025-07-31 | Resolved: 2025-07-31

## 2025-07-31 PROBLEM — F41.8 MIXED ANXIETY DEPRESSIVE DISORDER: Status: RESOLVED | Noted: 2025-07-31 | Resolved: 2025-07-31

## 2025-07-31 PROBLEM — S46.019A TRAUMATIC ROTATOR CUFF TEAR: Status: RESOLVED | Noted: 2021-06-01 | Resolved: 2025-07-31

## 2025-07-31 PROBLEM — A49.9 BACTERIAL INFECTION: Status: RESOLVED | Noted: 2023-11-01 | Resolved: 2025-07-31

## 2025-07-31 PROBLEM — R09.81 CONGESTION OF PARANASAL SINUS: Status: RESOLVED | Noted: 2025-07-31 | Resolved: 2025-07-31

## 2025-07-31 PROBLEM — S92.403A FRACTURE OF PHALANX OF GREAT TOE: Status: ACTIVE | Noted: 2023-11-01

## 2025-07-31 PROBLEM — S43.92XA SPRAIN OF LEFT SHOULDER GIRDLE: Status: RESOLVED | Noted: 2023-11-01 | Resolved: 2025-07-31

## 2025-07-31 PROBLEM — S83.209A TEAR OF MENISCUS OF KNEE: Status: ACTIVE | Noted: 2023-11-01

## 2025-07-31 PROCEDURE — 1036F TOBACCO NON-USER: CPT | Performed by: STUDENT IN AN ORGANIZED HEALTH CARE EDUCATION/TRAINING PROGRAM

## 2025-07-31 PROCEDURE — 99024 POSTOP FOLLOW-UP VISIT: CPT | Performed by: STUDENT IN AN ORGANIZED HEALTH CARE EDUCATION/TRAINING PROGRAM

## 2025-07-31 NOTE — PROGRESS NOTES
2 weeks S/p right CTR. Doing well. Denies numbness or tingling.     Physical Examination:  The patient appears to be their stated age, is in no apparent distress, and is oriented x3. The patients mood and affect are appropriate. The patients gait is normal. The examination of the limb in question was performed in comparison to the contralateral limb.    Incision c/d/I  AIN, PIN, ulnar intact  SILT A/R/U/M  Hand wwp    Assessment:  2 weeks post op    Plan:   The patient will progress to weightbearing to tolerance with theupper extremity.  We reviewed range of motion recovery exercises to the digits and wrist, scar massage and desensitization techniques.  The patient will work on these on her own with home exercise program.  Follow up with our office on an as-needed basis.  We did offer a formal therapy referral.  They declined in favor of home exercise program.   The patient verbalized agreement and understanding of plan for care.  All questions were answered at today's visit.          Kimberly Dailey MD

## 2025-09-03 ENCOUNTER — APPOINTMENT (OUTPATIENT)
Dept: CT IMAGING | Age: 35
End: 2025-09-03
Payer: COMMERCIAL

## 2025-09-03 ENCOUNTER — HOSPITAL ENCOUNTER (EMERGENCY)
Age: 35
Discharge: HOME | End: 2025-09-03
Attending: EMERGENCY MEDICINE
Payer: COMMERCIAL

## 2025-09-03 VITALS
OXYGEN SATURATION: 98 % | SYSTOLIC BLOOD PRESSURE: 100 MMHG | HEART RATE: 75 BPM | DIASTOLIC BLOOD PRESSURE: 66 MMHG | BODY MASS INDEX: 26.66 KG/M2 | TEMPERATURE: 98.4 F | RESPIRATION RATE: 20 BRPM | WEIGHT: 160 LBS | HEIGHT: 65 IN

## 2025-09-03 DIAGNOSIS — R10.84 GENERALIZED ABDOMINAL PAIN: Primary | ICD-10-CM

## 2025-09-03 LAB
ALBUMIN SERPL-MCNC: 4.6 G/DL (ref 3.5–4.6)
ALP SERPL-CCNC: 91 U/L (ref 40–130)
ALT SERPL-CCNC: 14 U/L (ref 0–33)
AMYLASE SERPL-CCNC: 53 U/L (ref 22–93)
ANION GAP SERPL CALCULATED.3IONS-SCNC: 12 MEQ/L (ref 9–15)
AST SERPL-CCNC: 17 U/L (ref 0–35)
BACTERIA URNS QL MICRO: ABNORMAL /HPF
BASOPHILS # BLD: 0 K/UL (ref 0–0.1)
BASOPHILS NFR BLD: 0.3 % (ref 0.1–1.2)
BILIRUB SERPL-MCNC: 0.3 MG/DL (ref 0.2–0.7)
BILIRUB UR QL STRIP: NEGATIVE
BUN SERPL-MCNC: 10 MG/DL (ref 6–20)
CALCIUM SERPL-MCNC: 9.3 MG/DL (ref 8.5–9.9)
CHLORIDE SERPL-SCNC: 102 MEQ/L (ref 95–107)
CLARITY UR: CLEAR
CO2 SERPL-SCNC: 24 MEQ/L (ref 20–31)
COLOR UR: YELLOW
CREAT SERPL-MCNC: 0.6 MG/DL (ref 0.5–0.9)
EOSINOPHIL # BLD: 0.1 K/UL (ref 0–0.4)
EOSINOPHIL NFR BLD: 1.1 % (ref 0.7–5.8)
EPI CELLS #/AREA URNS HPF: ABNORMAL /HPF
ERYTHROCYTE [DISTWIDTH] IN BLOOD BY AUTOMATED COUNT: 12.3 % (ref 11.7–14.4)
GLOBULIN SER CALC-MCNC: 2.9 G/DL (ref 2.3–3.5)
GLUCOSE SERPL-MCNC: 107 MG/DL (ref 70–99)
GLUCOSE UR STRIP-MCNC: NEGATIVE MG/DL
HCG UR QL: NEGATIVE
HCT VFR BLD AUTO: 37.5 % (ref 37–47)
HGB BLD-MCNC: 12.2 G/DL (ref 11.2–15.7)
HGB UR QL STRIP: NORMAL
IMM GRANULOCYTES # BLD: 0 K/UL
IMM GRANULOCYTES NFR BLD: 0.3 %
KETONES UR STRIP-MCNC: NEGATIVE MG/DL
LACTATE BLDV-SCNC: 1.3 MMOL/L (ref 0.5–2.2)
LEUKOCYTE ESTERASE UR QL STRIP: NEGATIVE
LIPASE SERPL-CCNC: 21 U/L (ref 12–95)
LYMPHOCYTES # BLD: 3.5 K/UL (ref 1.2–3.7)
LYMPHOCYTES NFR BLD: 28.1 %
MCH RBC QN AUTO: 28.2 PG (ref 25.6–32.2)
MCHC RBC AUTO-ENTMCNC: 32.5 % (ref 32.2–35.5)
MCV RBC AUTO: 86.8 FL (ref 79.4–94.8)
MONOCYTES # BLD: 0.7 K/UL (ref 0.2–0.9)
MONOCYTES NFR BLD: 5.6 % (ref 4.7–12.5)
NEUTROPHILS # BLD: 8 K/UL (ref 1.6–6.1)
NEUTS SEG NFR BLD: 64.6 % (ref 34–71.1)
NITRITE UR QL STRIP: NEGATIVE
PH UR STRIP: 7 [PH] (ref 5–9)
PLATELET # BLD AUTO: 337 K/UL (ref 182–369)
POTASSIUM SERPL-SCNC: 3.7 MEQ/L (ref 3.4–4.9)
PROT SERPL-MCNC: 7.5 G/DL (ref 6.3–8)
PROT UR STRIP-MCNC: NEGATIVE MG/DL
RBC # BLD AUTO: 4.32 M/UL (ref 3.93–5.22)
RBC #/AREA URNS HPF: ABNORMAL /HPF (ref 0–2)
SODIUM SERPL-SCNC: 138 MEQ/L (ref 135–144)
SP GR UR STRIP: 1.02 (ref 1–1.03)
URINE REFLEX TO CULTURE: NORMAL
UROBILINOGEN UR STRIP-ACNC: 1 E.U./DL
WBC # BLD AUTO: 12.5 K/UL (ref 4–10)
WBC #/AREA URNS HPF: ABNORMAL /HPF (ref 0–5)

## 2025-09-03 PROCEDURE — 81001 URINALYSIS AUTO W/SCOPE: CPT

## 2025-09-03 PROCEDURE — 2580000003 HC RX 258: Performed by: EMERGENCY MEDICINE

## 2025-09-03 PROCEDURE — 85025 COMPLETE CBC W/AUTO DIFF WBC: CPT

## 2025-09-03 PROCEDURE — 96374 THER/PROPH/DIAG INJ IV PUSH: CPT

## 2025-09-03 PROCEDURE — 83690 ASSAY OF LIPASE: CPT

## 2025-09-03 PROCEDURE — 6360000002 HC RX W HCPCS: Performed by: EMERGENCY MEDICINE

## 2025-09-03 PROCEDURE — 99285 EMERGENCY DEPT VISIT HI MDM: CPT

## 2025-09-03 PROCEDURE — 6360000004 HC RX CONTRAST MEDICATION: Performed by: EMERGENCY MEDICINE

## 2025-09-03 PROCEDURE — 80053 COMPREHEN METABOLIC PANEL: CPT

## 2025-09-03 PROCEDURE — 36415 COLL VENOUS BLD VENIPUNCTURE: CPT

## 2025-09-03 PROCEDURE — 74177 CT ABD & PELVIS W/CONTRAST: CPT

## 2025-09-03 PROCEDURE — 83605 ASSAY OF LACTIC ACID: CPT

## 2025-09-03 PROCEDURE — 82150 ASSAY OF AMYLASE: CPT

## 2025-09-03 PROCEDURE — 96375 TX/PRO/DX INJ NEW DRUG ADDON: CPT

## 2025-09-03 PROCEDURE — 84703 CHORIONIC GONADOTROPIN ASSAY: CPT

## 2025-09-03 RX ORDER — IOPAMIDOL 755 MG/ML
75 INJECTION, SOLUTION INTRAVASCULAR
Status: CANCELLED | OUTPATIENT
Start: 2025-09-03

## 2025-09-03 RX ORDER — ONDANSETRON 2 MG/ML
4 INJECTION INTRAMUSCULAR; INTRAVENOUS ONCE
Status: COMPLETED | OUTPATIENT
Start: 2025-09-03 | End: 2025-09-03

## 2025-09-03 RX ORDER — 0.9 % SODIUM CHLORIDE 0.9 %
1000 INTRAVENOUS SOLUTION INTRAVENOUS ONCE
Status: COMPLETED | OUTPATIENT
Start: 2025-09-03 | End: 2025-09-03

## 2025-09-03 RX ORDER — KETOROLAC TROMETHAMINE 30 MG/ML
30 INJECTION, SOLUTION INTRAMUSCULAR; INTRAVENOUS ONCE
Status: COMPLETED | OUTPATIENT
Start: 2025-09-03 | End: 2025-09-03

## 2025-09-03 RX ORDER — IOPAMIDOL 755 MG/ML
75 INJECTION, SOLUTION INTRAVASCULAR
Status: COMPLETED | OUTPATIENT
Start: 2025-09-03 | End: 2025-09-03

## 2025-09-03 RX ADMIN — ONDANSETRON 4 MG: 2 INJECTION, SOLUTION INTRAMUSCULAR; INTRAVENOUS at 15:43

## 2025-09-03 RX ADMIN — SODIUM CHLORIDE 1000 ML: 9 INJECTION, SOLUTION INTRAVENOUS at 15:41

## 2025-09-03 RX ADMIN — IOPAMIDOL 75 ML: 755 INJECTION, SOLUTION INTRAVENOUS at 16:34

## 2025-09-03 RX ADMIN — KETOROLAC TROMETHAMINE 30 MG: 30 INJECTION, SOLUTION INTRAMUSCULAR at 15:43

## 2025-09-03 ASSESSMENT — PAIN DESCRIPTION - DESCRIPTORS
DESCRIPTORS: SHARP;DULL
DESCRIPTORS: SHARP

## 2025-09-03 ASSESSMENT — PAIN - FUNCTIONAL ASSESSMENT
PAIN_FUNCTIONAL_ASSESSMENT: PREVENTS OR INTERFERES SOME ACTIVE ACTIVITIES AND ADLS
PAIN_FUNCTIONAL_ASSESSMENT: 0-10
PAIN_FUNCTIONAL_ASSESSMENT: 0-10

## 2025-09-03 ASSESSMENT — ENCOUNTER SYMPTOMS
WHEEZING: 0
EYE REDNESS: 0
CHOKING: 0
VOICE CHANGE: 0
ABDOMINAL PAIN: 1
COUGH: 0
CHEST TIGHTNESS: 0
EYE PAIN: 0
SINUS PRESSURE: 0
BACK PAIN: 0
SHORTNESS OF BREATH: 0
EYE DISCHARGE: 0
FACIAL SWELLING: 0
BLOOD IN STOOL: 0
TROUBLE SWALLOWING: 0
DIARRHEA: 0
CONSTIPATION: 0
SORE THROAT: 0
STRIDOR: 0

## 2025-09-03 ASSESSMENT — PAIN DESCRIPTION - LOCATION
LOCATION: ABDOMEN
LOCATION: ABDOMEN

## 2025-09-03 ASSESSMENT — PAIN SCALES - GENERAL
PAINLEVEL_OUTOF10: 6
PAINLEVEL_OUTOF10: 5

## 2025-09-03 ASSESSMENT — PAIN DESCRIPTION - ONSET: ONSET: SUDDEN

## 2025-09-03 ASSESSMENT — LIFESTYLE VARIABLES
HOW OFTEN DO YOU HAVE A DRINK CONTAINING ALCOHOL: NEVER
HOW MANY STANDARD DRINKS CONTAINING ALCOHOL DO YOU HAVE ON A TYPICAL DAY: PATIENT DOES NOT DRINK

## 2025-09-03 ASSESSMENT — PAIN DESCRIPTION - PAIN TYPE: TYPE: ACUTE PAIN

## 2025-09-03 ASSESSMENT — PAIN DESCRIPTION - FREQUENCY: FREQUENCY: CONTINUOUS

## 2025-09-03 ASSESSMENT — PAIN DESCRIPTION - ORIENTATION: ORIENTATION: RIGHT

## 2025-09-17 ENCOUNTER — APPOINTMENT (OUTPATIENT)
Dept: PRIMARY CARE | Facility: CLINIC | Age: 35
End: 2025-09-17
Payer: COMMERCIAL

## (undated) DEVICE — CAUTERY, PENCIL, PUSH BUTTON, SMOKE EVAC, 70MM

## (undated) DEVICE — BOWL, BASIN, 32 OZ, STERILE

## (undated) DEVICE — SUTURE, MONOCRYL, 4-0, 27 IN, PS-2, UNDYED

## (undated) DEVICE — STRIP, SKIN CLOSURE, COMPOUND BENZION TINCTURE 0.6ML

## (undated) DEVICE — DRAPE, SHEET, THREE QUARTER, FAN FOLD, 57 X 77 IN

## (undated) DEVICE — SUTURE, VICRYL PLUS 3-0, SH, 27IN

## (undated) DEVICE — SYRINGE, 10 CC, LUER LOCK

## (undated) DEVICE — DRESSING, NON-ADHERENT, TELFA, OUCHLESS, 3 X 8 IN, STERILE

## (undated) DEVICE — STRIP, SKIN CLOSURE, STERI STRIP, REINFORCED, 0.5 X 4 IN

## (undated) DEVICE — NEEDLE, SAFETY, 25 GA X 1.5 IN

## (undated) DEVICE — SUTURE, SILK, 2-0, 30 IN, SH, BLACK

## (undated) DEVICE — STOCKINETTE, TUBE, BLN, ST, 1 PLY, 6 X 60 IN, LF

## (undated) DEVICE — ELECTRODE, ELECTROSURGICAL, BLADE, INSULATED, ENT/IMA, STERILE

## (undated) DEVICE — GLOVE, SURGICAL, PROTEXIS PI , 6.0, PF, LF

## (undated) DEVICE — DRAPE, SHEET, LAPAROTOMY, W/ISO-BAC, W/ARMBOARD COVERS, 98 X 122 IN, DISPOSABLE, LF, STERILE

## (undated) DEVICE — GLOVE, SURGICAL, PROTEXIS PI BLUE W/NEUTHERA, 6.5, PF, LF

## (undated) DEVICE — Device

## (undated) DEVICE — SUTURE, SILK, 2-0, 18 IN, BLACK

## (undated) DEVICE — MANIFOLD, 4 PORT NEPTUNE STANDARD

## (undated) DEVICE — APPLICATOR, CHLORAPREP, W/ORANGE TINT, 26ML

## (undated) DEVICE — TOWEL PACK, STERILE, 16X24, XRAY DETECTABLE, BLUE, 4/PK

## (undated) DEVICE — STRAP, VELCRO, BODY, 4 X 60IN, NS

## (undated) DEVICE — DRAPE, SHEET, XL

## (undated) DEVICE — STRAP, ARM BOARD, 32 X 1.5